# Patient Record
Sex: MALE | Race: WHITE | NOT HISPANIC OR LATINO | URBAN - METROPOLITAN AREA
[De-identification: names, ages, dates, MRNs, and addresses within clinical notes are randomized per-mention and may not be internally consistent; named-entity substitution may affect disease eponyms.]

---

## 2018-02-01 ENCOUNTER — OUTPATIENT (OUTPATIENT)
Dept: OUTPATIENT SERVICES | Facility: HOSPITAL | Age: 83
LOS: 1 days | End: 2018-02-01
Payer: MEDICAID

## 2018-02-01 PROCEDURE — G9001: CPT

## 2018-02-12 ENCOUNTER — EMERGENCY (EMERGENCY)
Facility: HOSPITAL | Age: 83
LOS: 1 days | Discharge: HOME | End: 2018-02-12
Attending: EMERGENCY MEDICINE | Admitting: EMERGENCY MEDICINE
Payer: MEDICARE

## 2018-02-12 VITALS
RESPIRATION RATE: 20 BRPM | DIASTOLIC BLOOD PRESSURE: 68 MMHG | HEART RATE: 79 BPM | TEMPERATURE: 100 F | OXYGEN SATURATION: 98 % | SYSTOLIC BLOOD PRESSURE: 156 MMHG

## 2018-02-12 VITALS
OXYGEN SATURATION: 96 % | HEART RATE: 98 BPM | SYSTOLIC BLOOD PRESSURE: 163 MMHG | RESPIRATION RATE: 18 BRPM | DIASTOLIC BLOOD PRESSURE: 66 MMHG | TEMPERATURE: 98 F

## 2018-02-12 LAB
ALBUMIN SERPL ELPH-MCNC: 3.9 G/DL — SIGNIFICANT CHANGE UP (ref 3.3–5)
ALP SERPL-CCNC: 92 U/L — SIGNIFICANT CHANGE UP (ref 40–120)
ALT FLD-CCNC: 17 U/L — SIGNIFICANT CHANGE UP (ref 4–41)
APTT BLD: 33.1 SEC — SIGNIFICANT CHANGE UP (ref 27.5–37.4)
AST SERPL-CCNC: 20 U/L — SIGNIFICANT CHANGE UP (ref 4–40)
B PERT DNA SPEC QL NAA+PROBE: SIGNIFICANT CHANGE UP
BASE EXCESS BLDV CALC-SCNC: 1.6 MMOL/L — SIGNIFICANT CHANGE UP
BASOPHILS # BLD AUTO: 0.02 K/UL — SIGNIFICANT CHANGE UP (ref 0–0.2)
BASOPHILS NFR BLD AUTO: 0.2 % — SIGNIFICANT CHANGE UP (ref 0–2)
BILIRUB SERPL-MCNC: 0.9 MG/DL — SIGNIFICANT CHANGE UP (ref 0.2–1.2)
BLOOD GAS VENOUS - CREATININE: 1.25 MG/DL — SIGNIFICANT CHANGE UP (ref 0.5–1.3)
BUN SERPL-MCNC: 18 MG/DL — SIGNIFICANT CHANGE UP (ref 7–23)
C PNEUM DNA SPEC QL NAA+PROBE: NOT DETECTED — SIGNIFICANT CHANGE UP
CALCIUM SERPL-MCNC: 8.8 MG/DL — SIGNIFICANT CHANGE UP (ref 8.4–10.5)
CHLORIDE BLDV-SCNC: 106 MMOL/L — SIGNIFICANT CHANGE UP (ref 96–108)
CHLORIDE SERPL-SCNC: 102 MMOL/L — SIGNIFICANT CHANGE UP (ref 98–107)
CK MB BLD-MCNC: 1.4 NG/ML — SIGNIFICANT CHANGE UP (ref 1–6.6)
CK SERPL-CCNC: 99 U/L — SIGNIFICANT CHANGE UP (ref 30–200)
CO2 SERPL-SCNC: 19 MMOL/L — LOW (ref 22–31)
CREAT SERPL-MCNC: 1.35 MG/DL — HIGH (ref 0.5–1.3)
EOSINOPHIL # BLD AUTO: 0 K/UL — SIGNIFICANT CHANGE UP (ref 0–0.5)
EOSINOPHIL NFR BLD AUTO: 0 % — SIGNIFICANT CHANGE UP (ref 0–6)
FLUAV H1 2009 PAND RNA SPEC QL NAA+PROBE: POSITIVE — HIGH
FLUAV H1 RNA SPEC QL NAA+PROBE: NOT DETECTED — SIGNIFICANT CHANGE UP
FLUAV H3 RNA SPEC QL NAA+PROBE: NOT DETECTED — SIGNIFICANT CHANGE UP
FLUBV RNA SPEC QL NAA+PROBE: NOT DETECTED — SIGNIFICANT CHANGE UP
GAS PNL BLDV: 135 MMOL/L — LOW (ref 136–146)
GLUCOSE BLDV-MCNC: 180 — HIGH (ref 70–99)
GLUCOSE SERPL-MCNC: 173 MG/DL — HIGH (ref 70–99)
HADV DNA SPEC QL NAA+PROBE: NOT DETECTED — SIGNIFICANT CHANGE UP
HCO3 BLDV-SCNC: 24 MMOL/L — SIGNIFICANT CHANGE UP (ref 20–27)
HCOV 229E RNA SPEC QL NAA+PROBE: NOT DETECTED — SIGNIFICANT CHANGE UP
HCOV HKU1 RNA SPEC QL NAA+PROBE: NOT DETECTED — SIGNIFICANT CHANGE UP
HCOV NL63 RNA SPEC QL NAA+PROBE: NOT DETECTED — SIGNIFICANT CHANGE UP
HCOV OC43 RNA SPEC QL NAA+PROBE: NOT DETECTED — SIGNIFICANT CHANGE UP
HCT VFR BLD CALC: 40.9 % — SIGNIFICANT CHANGE UP (ref 39–50)
HCT VFR BLDV CALC: 43.8 % — SIGNIFICANT CHANGE UP (ref 39–51)
HGB BLD-MCNC: 13.8 G/DL — SIGNIFICANT CHANGE UP (ref 13–17)
HGB BLDV-MCNC: 14.3 G/DL — SIGNIFICANT CHANGE UP (ref 13–17)
HMPV RNA SPEC QL NAA+PROBE: NOT DETECTED — SIGNIFICANT CHANGE UP
HPIV1 RNA SPEC QL NAA+PROBE: NOT DETECTED — SIGNIFICANT CHANGE UP
HPIV2 RNA SPEC QL NAA+PROBE: NOT DETECTED — SIGNIFICANT CHANGE UP
HPIV3 RNA SPEC QL NAA+PROBE: NOT DETECTED — SIGNIFICANT CHANGE UP
HPIV4 RNA SPEC QL NAA+PROBE: NOT DETECTED — SIGNIFICANT CHANGE UP
IMM GRANULOCYTES # BLD AUTO: 0.03 # — SIGNIFICANT CHANGE UP
IMM GRANULOCYTES NFR BLD AUTO: 0.3 % — SIGNIFICANT CHANGE UP (ref 0–1.5)
INR BLD: 1.08 — SIGNIFICANT CHANGE UP (ref 0.88–1.17)
LACTATE BLDV-MCNC: 1.7 MMOL/L — SIGNIFICANT CHANGE UP (ref 0.5–2)
LYMPHOCYTES # BLD AUTO: 1.11 K/UL — SIGNIFICANT CHANGE UP (ref 1–3.3)
LYMPHOCYTES # BLD AUTO: 11.9 % — LOW (ref 13–44)
M PNEUMO DNA SPEC QL NAA+PROBE: NOT DETECTED — SIGNIFICANT CHANGE UP
MAGNESIUM SERPL-MCNC: 1.7 MG/DL — SIGNIFICANT CHANGE UP (ref 1.6–2.6)
MCHC RBC-ENTMCNC: 29.5 PG — SIGNIFICANT CHANGE UP (ref 27–34)
MCHC RBC-ENTMCNC: 33.7 % — SIGNIFICANT CHANGE UP (ref 32–36)
MCV RBC AUTO: 87.4 FL — SIGNIFICANT CHANGE UP (ref 80–100)
MONOCYTES # BLD AUTO: 0.69 K/UL — SIGNIFICANT CHANGE UP (ref 0–0.9)
MONOCYTES NFR BLD AUTO: 7.4 % — SIGNIFICANT CHANGE UP (ref 2–14)
NEUTROPHILS # BLD AUTO: 7.5 K/UL — HIGH (ref 1.8–7.4)
NEUTROPHILS NFR BLD AUTO: 80.2 % — HIGH (ref 43–77)
NRBC # FLD: 0 — SIGNIFICANT CHANGE UP
PCO2 BLDV: 42 MMHG — SIGNIFICANT CHANGE UP (ref 41–51)
PH BLDV: 7.4 PH — SIGNIFICANT CHANGE UP (ref 7.32–7.43)
PHOSPHATE SERPL-MCNC: 2.9 MG/DL — SIGNIFICANT CHANGE UP (ref 2.5–4.5)
PLATELET # BLD AUTO: 142 K/UL — LOW (ref 150–400)
PMV BLD: 9.6 FL — SIGNIFICANT CHANGE UP (ref 7–13)
PO2 BLDV: < 24 MMHG — LOW (ref 35–40)
POTASSIUM BLDV-SCNC: 3.9 MMOL/L — SIGNIFICANT CHANGE UP (ref 3.4–4.5)
POTASSIUM SERPL-MCNC: 4.4 MMOL/L — SIGNIFICANT CHANGE UP (ref 3.5–5.3)
POTASSIUM SERPL-SCNC: 4.4 MMOL/L — SIGNIFICANT CHANGE UP (ref 3.5–5.3)
PROT SERPL-MCNC: 6.8 G/DL — SIGNIFICANT CHANGE UP (ref 6–8.3)
PROTHROM AB SERPL-ACNC: 12.4 SEC — SIGNIFICANT CHANGE UP (ref 9.8–13.1)
RBC # BLD: 4.68 M/UL — SIGNIFICANT CHANGE UP (ref 4.2–5.8)
RBC # FLD: 13.6 % — SIGNIFICANT CHANGE UP (ref 10.3–14.5)
RSV RNA SPEC QL NAA+PROBE: NOT DETECTED — SIGNIFICANT CHANGE UP
RV+EV RNA SPEC QL NAA+PROBE: NOT DETECTED — SIGNIFICANT CHANGE UP
SAO2 % BLDV: 33.6 % — LOW (ref 60–85)
SODIUM SERPL-SCNC: 140 MMOL/L — SIGNIFICANT CHANGE UP (ref 135–145)
TROPONIN T SERPL-MCNC: < 0.06 NG/ML — SIGNIFICANT CHANGE UP (ref 0–0.06)
WBC # BLD: 9.35 K/UL — SIGNIFICANT CHANGE UP (ref 3.8–10.5)
WBC # FLD AUTO: 9.35 K/UL — SIGNIFICANT CHANGE UP (ref 3.8–10.5)

## 2018-02-12 PROCEDURE — 70450 CT HEAD/BRAIN W/O DYE: CPT | Mod: 26

## 2018-02-12 PROCEDURE — 93010 ELECTROCARDIOGRAM REPORT: CPT

## 2018-02-12 PROCEDURE — 99284 EMERGENCY DEPT VISIT MOD MDM: CPT | Mod: 25,GC

## 2018-02-12 PROCEDURE — 71046 X-RAY EXAM CHEST 2 VIEWS: CPT | Mod: 26

## 2018-02-12 PROCEDURE — 72125 CT NECK SPINE W/O DYE: CPT | Mod: 26

## 2018-02-12 RX ORDER — ACETAMINOPHEN 500 MG
650 TABLET ORAL ONCE
Qty: 0 | Refills: 0 | Status: COMPLETED | OUTPATIENT
Start: 2018-02-12 | End: 2018-02-12

## 2018-02-12 RX ORDER — SODIUM CHLORIDE 9 MG/ML
1500 INJECTION INTRAMUSCULAR; INTRAVENOUS; SUBCUTANEOUS ONCE
Qty: 0 | Refills: 0 | Status: COMPLETED | OUTPATIENT
Start: 2018-02-12 | End: 2018-02-12

## 2018-02-12 RX ORDER — IBUPROFEN 200 MG
400 TABLET ORAL ONCE
Qty: 0 | Refills: 0 | Status: COMPLETED | OUTPATIENT
Start: 2018-02-12 | End: 2018-02-12

## 2018-02-12 RX ADMIN — Medication 650 MILLIGRAM(S): at 10:32

## 2018-02-12 RX ADMIN — SODIUM CHLORIDE 3000 MILLILITER(S): 9 INJECTION INTRAMUSCULAR; INTRAVENOUS; SUBCUTANEOUS at 10:28

## 2018-02-12 RX ADMIN — Medication 400 MILLIGRAM(S): at 12:43

## 2018-02-12 NOTE — ED PROVIDER NOTE - PLAN OF CARE
Follow up with your PMD in 24-48 hours. Return to the ED if you have any shortness of breath, chest pain, inability to drink, or any other concerning symptoms. Take tylenol as needed for fever.

## 2018-02-12 NOTE — ED ADULT NURSE NOTE - OBJECTIVE STATEMENT
pt received spot 24. pt A+Ox2  coming from home s/p fall last night at approx 2 am. pt was found on the floor by HHA this morning. pt states he does not recall the fall. questionable head injury/loc. pt denies any pain or discomfort. + wet cough noted. +chills. pt febrile. respirations even and unlabored. placed on CM. EKG in progress. labs sent. IVSL in place. pt placed on droplet precautions to r/i influenza. family at bedside. skin warm, dry and intact. will continue to monitor.

## 2018-02-12 NOTE — ED PROVIDER NOTE - MEDICAL DECISION MAKING DETAILS
86M PMH HTN T2DM CAD on A/C presenting s/p fall, found to be febrile with cough, and family worried about AMS. Will check CT-H, chest xray, sepsis work up, possible admit, give IVF

## 2018-02-12 NOTE — ED PROVIDER NOTE - ATTENDING CONTRIBUTION TO CARE
87yo M found on floor, down overnight, fall out of bed, unable to give hx of fall, found to be febrile, +cough/congestion, denies prev. recent illness, no sick contacts, takes coumadin, denies HA/N/V/vision change. Clear lungs, heart irreg, no murmur, soft, NT abd, NT calves, moves all ext., no skin ecchymoses.

## 2018-02-12 NOTE — ED ADULT TRIAGE NOTE - CHIEF COMPLAINT QUOTE
Pt brought in by EMS from home, pt Solomon Islander speaking with daughter in law at bedside to translate. Pt was found on the floor by his home health aide this AM. Pt denies any pain but states he feels SOB. Pt denies chest pain, N/V/D, fever chills, no LOC. .

## 2018-02-12 NOTE — ED PROVIDER NOTE - PROGRESS NOTE DETAILS
Mateo: Pt flu positive but feels improved afte rtylenol, IVF. No evidence of PNA on CXR. CMP at baseline. Will d/c home

## 2018-02-12 NOTE — ED ADULT NURSE NOTE - CHIEF COMPLAINT QUOTE
Pt brought in by EMS from home, pt Turkmen speaking with daughter in law at bedside to translate. Pt was found on the floor by his home health aide this AM. Pt denies any pain but states he feels SOB. Pt denies chest pain, N/V/D, fever chills, no LOC. .

## 2018-02-12 NOTE — ED PROVIDER NOTE - OBJECTIVE STATEMENT
86M PMH T2DM, HTN, CAD s/p stent 5 y ago, on coumadin for unclear reason, who presents s/p fall at home today.  Per family at bedside the pt was found on the floor of his bedroom at 7:30 am today, neighbors noted that they heard a thud at approx 2AM. Pt had been in normal health prior to today, ambulating around the neighborhood and performing most of his ADLs. Currently has cough with sputum production for unknown amount of time. No known fevers, chills, abd pain, n/v/d. No known sick contacts. No recent travel. Pt had knee injection approx 1 week ago for difficulty moving.     Pt is Tuvaluan speaking and requesting family at bedside to translate, translation services offered.    Meds: coumadin, metformin, tamsulosin, glipizide, nifedipine, vascepa, isosrbide, diovan, coumadin

## 2018-02-12 NOTE — ED PROVIDER NOTE - CARE PLAN
Principal Discharge DX:	Fever Principal Discharge DX:	Fever  Assessment and plan of treatment:	Follow up with your PMD in 24-48 hours. Return to the ED if you have any shortness of breath, chest pain, inability to drink, or any other concerning symptoms. Take tylenol as needed for fever.

## 2018-02-13 LAB
SPECIMEN SOURCE: SIGNIFICANT CHANGE UP
SPECIMEN SOURCE: SIGNIFICANT CHANGE UP

## 2018-02-16 ENCOUNTER — INPATIENT (INPATIENT)
Facility: HOSPITAL | Age: 83
LOS: 3 days | Discharge: HOME HEALTH | End: 2018-02-20
Attending: HOSPITALIST | Admitting: HOSPITALIST
Payer: MEDICARE

## 2018-02-16 VITALS
DIASTOLIC BLOOD PRESSURE: 85 MMHG | TEMPERATURE: 99 F | SYSTOLIC BLOOD PRESSURE: 103 MMHG | RESPIRATION RATE: 16 BRPM | OXYGEN SATURATION: 100 % | HEART RATE: 78 BPM

## 2018-02-16 DIAGNOSIS — E78.00 PURE HYPERCHOLESTEROLEMIA, UNSPECIFIED: ICD-10-CM

## 2018-02-16 DIAGNOSIS — R74.8 ABNORMAL LEVELS OF OTHER SERUM ENZYMES: ICD-10-CM

## 2018-02-16 DIAGNOSIS — I48.91 UNSPECIFIED ATRIAL FIBRILLATION: ICD-10-CM

## 2018-02-16 DIAGNOSIS — E11.9 TYPE 2 DIABETES MELLITUS WITHOUT COMPLICATIONS: ICD-10-CM

## 2018-02-16 DIAGNOSIS — Z95.5 PRESENCE OF CORONARY ANGIOPLASTY IMPLANT AND GRAFT: Chronic | ICD-10-CM

## 2018-02-16 DIAGNOSIS — I50.9 HEART FAILURE, UNSPECIFIED: ICD-10-CM

## 2018-02-16 DIAGNOSIS — J18.9 PNEUMONIA, UNSPECIFIED ORGANISM: ICD-10-CM

## 2018-02-16 DIAGNOSIS — I10 ESSENTIAL (PRIMARY) HYPERTENSION: ICD-10-CM

## 2018-02-16 DIAGNOSIS — R07.81 PLEURODYNIA: ICD-10-CM

## 2018-02-16 LAB
ALBUMIN SERPL ELPH-MCNC: 3.7 G/DL — SIGNIFICANT CHANGE UP (ref 3.3–5)
ALP SERPL-CCNC: 204 U/L — HIGH (ref 40–120)
ALT FLD-CCNC: 41 U/L — SIGNIFICANT CHANGE UP (ref 4–41)
APTT BLD: 33.6 SEC — SIGNIFICANT CHANGE UP (ref 27.5–37.4)
AST SERPL-CCNC: 34 U/L — SIGNIFICANT CHANGE UP (ref 4–40)
BASE EXCESS BLDV CALC-SCNC: 1.2 MMOL/L — SIGNIFICANT CHANGE UP
BASOPHILS # BLD AUTO: 0.04 K/UL — SIGNIFICANT CHANGE UP (ref 0–0.2)
BASOPHILS NFR BLD AUTO: 0.4 % — SIGNIFICANT CHANGE UP (ref 0–2)
BILIRUB SERPL-MCNC: 0.9 MG/DL — SIGNIFICANT CHANGE UP (ref 0.2–1.2)
BLOOD GAS VENOUS - CREATININE: 1 MG/DL — SIGNIFICANT CHANGE UP (ref 0.5–1.3)
BUN SERPL-MCNC: 16 MG/DL — SIGNIFICANT CHANGE UP (ref 7–23)
CALCIUM SERPL-MCNC: 8.3 MG/DL — LOW (ref 8.4–10.5)
CHLORIDE BLDV-SCNC: 109 MMOL/L — HIGH (ref 96–108)
CHLORIDE SERPL-SCNC: 105 MMOL/L — SIGNIFICANT CHANGE UP (ref 98–107)
CK MB BLD-MCNC: 2.09 NG/ML — SIGNIFICANT CHANGE UP (ref 1–6.6)
CK SERPL-CCNC: 116 U/L — SIGNIFICANT CHANGE UP (ref 30–200)
CO2 SERPL-SCNC: 25 MMOL/L — SIGNIFICANT CHANGE UP (ref 22–31)
CREAT SERPL-MCNC: 1.07 MG/DL — SIGNIFICANT CHANGE UP (ref 0.5–1.3)
EOSINOPHIL # BLD AUTO: 0.03 K/UL — SIGNIFICANT CHANGE UP (ref 0–0.5)
EOSINOPHIL NFR BLD AUTO: 0.3 % — SIGNIFICANT CHANGE UP (ref 0–6)
GAS PNL BLDV: 136 MMOL/L — SIGNIFICANT CHANGE UP (ref 136–146)
GLUCOSE BLDV-MCNC: 213 — HIGH (ref 70–99)
GLUCOSE SERPL-MCNC: 215 MG/DL — HIGH (ref 70–99)
HCO3 BLDV-SCNC: 25 MMOL/L — SIGNIFICANT CHANGE UP (ref 20–27)
HCT VFR BLD CALC: 37.4 % — LOW (ref 39–50)
HCT VFR BLDV CALC: 40.4 % — SIGNIFICANT CHANGE UP (ref 39–51)
HGB BLD-MCNC: 12.3 G/DL — LOW (ref 13–17)
HGB BLDV-MCNC: 13.1 G/DL — SIGNIFICANT CHANGE UP (ref 13–17)
IMM GRANULOCYTES # BLD AUTO: 0.06 # — SIGNIFICANT CHANGE UP
IMM GRANULOCYTES NFR BLD AUTO: 0.6 % — SIGNIFICANT CHANGE UP (ref 0–1.5)
INR BLD: 1.04 — SIGNIFICANT CHANGE UP (ref 0.88–1.17)
LACTATE BLDV-MCNC: 1.4 MMOL/L — SIGNIFICANT CHANGE UP (ref 0.5–2)
LYMPHOCYTES # BLD AUTO: 0.96 K/UL — LOW (ref 1–3.3)
LYMPHOCYTES # BLD AUTO: 10 % — LOW (ref 13–44)
MCHC RBC-ENTMCNC: 28.6 PG — SIGNIFICANT CHANGE UP (ref 27–34)
MCHC RBC-ENTMCNC: 32.9 % — SIGNIFICANT CHANGE UP (ref 32–36)
MCV RBC AUTO: 87 FL — SIGNIFICANT CHANGE UP (ref 80–100)
MONOCYTES # BLD AUTO: 0.56 K/UL — SIGNIFICANT CHANGE UP (ref 0–0.9)
MONOCYTES NFR BLD AUTO: 5.8 % — SIGNIFICANT CHANGE UP (ref 2–14)
NEUTROPHILS # BLD AUTO: 7.98 K/UL — HIGH (ref 1.8–7.4)
NEUTROPHILS NFR BLD AUTO: 82.9 % — HIGH (ref 43–77)
NRBC # FLD: 0 — SIGNIFICANT CHANGE UP
NT-PROBNP SERPL-SCNC: 1945 PG/ML — SIGNIFICANT CHANGE UP
PCO2 BLDV: 36 MMHG — LOW (ref 41–51)
PH BLDV: 7.45 PH — HIGH (ref 7.32–7.43)
PLATELET # BLD AUTO: 173 K/UL — SIGNIFICANT CHANGE UP (ref 150–400)
PMV BLD: 9.8 FL — SIGNIFICANT CHANGE UP (ref 7–13)
PO2 BLDV: 28 MMHG — LOW (ref 35–40)
POTASSIUM BLDV-SCNC: 3.8 MMOL/L — SIGNIFICANT CHANGE UP (ref 3.4–4.5)
POTASSIUM SERPL-MCNC: 4.2 MMOL/L — SIGNIFICANT CHANGE UP (ref 3.5–5.3)
POTASSIUM SERPL-SCNC: 4.2 MMOL/L — SIGNIFICANT CHANGE UP (ref 3.5–5.3)
PROT SERPL-MCNC: 6.6 G/DL — SIGNIFICANT CHANGE UP (ref 6–8.3)
PROTHROM AB SERPL-ACNC: 11.5 SEC — SIGNIFICANT CHANGE UP (ref 9.8–13.1)
RBC # BLD: 4.3 M/UL — SIGNIFICANT CHANGE UP (ref 4.2–5.8)
RBC # FLD: 13.8 % — SIGNIFICANT CHANGE UP (ref 10.3–14.5)
SAO2 % BLDV: 49.9 % — LOW (ref 60–85)
SODIUM SERPL-SCNC: 142 MMOL/L — SIGNIFICANT CHANGE UP (ref 135–145)
TROPONIN T SERPL-MCNC: < 0.06 NG/ML — SIGNIFICANT CHANGE UP (ref 0–0.06)
WBC # BLD: 9.63 K/UL — SIGNIFICANT CHANGE UP (ref 3.8–10.5)
WBC # FLD AUTO: 9.63 K/UL — SIGNIFICANT CHANGE UP (ref 3.8–10.5)

## 2018-02-16 PROCEDURE — 71275 CT ANGIOGRAPHY CHEST: CPT | Mod: 26

## 2018-02-16 PROCEDURE — 71045 X-RAY EXAM CHEST 1 VIEW: CPT | Mod: 26

## 2018-02-16 PROCEDURE — 99223 1ST HOSP IP/OBS HIGH 75: CPT

## 2018-02-16 RX ORDER — NIFEDIPINE 30 MG
30 TABLET, EXTENDED RELEASE 24 HR ORAL DAILY
Qty: 0 | Refills: 0 | Status: DISCONTINUED | OUTPATIENT
Start: 2018-02-16 | End: 2018-02-20

## 2018-02-16 RX ORDER — SODIUM CHLORIDE 9 MG/ML
1000 INJECTION, SOLUTION INTRAVENOUS
Qty: 0 | Refills: 0 | Status: DISCONTINUED | OUTPATIENT
Start: 2018-02-16 | End: 2018-02-20

## 2018-02-16 RX ORDER — DEXTROSE 50 % IN WATER 50 %
25 SYRINGE (ML) INTRAVENOUS ONCE
Qty: 0 | Refills: 0 | Status: DISCONTINUED | OUTPATIENT
Start: 2018-02-16 | End: 2018-02-20

## 2018-02-16 RX ORDER — DEXTROSE 50 % IN WATER 50 %
1 SYRINGE (ML) INTRAVENOUS ONCE
Qty: 0 | Refills: 0 | Status: DISCONTINUED | OUTPATIENT
Start: 2018-02-16 | End: 2018-02-20

## 2018-02-16 RX ORDER — TAMSULOSIN HYDROCHLORIDE 0.4 MG/1
0.4 CAPSULE ORAL AT BEDTIME
Qty: 0 | Refills: 0 | Status: DISCONTINUED | OUTPATIENT
Start: 2018-02-16 | End: 2018-02-20

## 2018-02-16 RX ORDER — DEXTROSE 50 % IN WATER 50 %
12.5 SYRINGE (ML) INTRAVENOUS ONCE
Qty: 0 | Refills: 0 | Status: DISCONTINUED | OUTPATIENT
Start: 2018-02-16 | End: 2018-02-20

## 2018-02-16 RX ORDER — GLUCAGON INJECTION, SOLUTION 0.5 MG/.1ML
1 INJECTION, SOLUTION SUBCUTANEOUS ONCE
Qty: 0 | Refills: 0 | Status: DISCONTINUED | OUTPATIENT
Start: 2018-02-16 | End: 2018-02-20

## 2018-02-16 RX ORDER — ACETAMINOPHEN 500 MG
650 TABLET ORAL EVERY 6 HOURS
Qty: 0 | Refills: 0 | Status: DISCONTINUED | OUTPATIENT
Start: 2018-02-16 | End: 2018-02-20

## 2018-02-16 RX ORDER — INSULIN LISPRO 100/ML
VIAL (ML) SUBCUTANEOUS AT BEDTIME
Qty: 0 | Refills: 0 | Status: DISCONTINUED | OUTPATIENT
Start: 2018-02-16 | End: 2018-02-20

## 2018-02-16 RX ORDER — ISOSORBIDE MONONITRATE 60 MG/1
30 TABLET, EXTENDED RELEASE ORAL DAILY
Qty: 0 | Refills: 0 | Status: DISCONTINUED | OUTPATIENT
Start: 2018-02-16 | End: 2018-02-20

## 2018-02-16 RX ORDER — ENOXAPARIN SODIUM 100 MG/ML
40 INJECTION SUBCUTANEOUS EVERY 24 HOURS
Qty: 0 | Refills: 0 | Status: DISCONTINUED | OUTPATIENT
Start: 2018-02-16 | End: 2018-02-19

## 2018-02-16 RX ORDER — FUROSEMIDE 40 MG
40 TABLET ORAL
Qty: 0 | Refills: 0 | Status: DISCONTINUED | OUTPATIENT
Start: 2018-02-16 | End: 2018-02-17

## 2018-02-16 RX ORDER — FUROSEMIDE 40 MG
40 TABLET ORAL ONCE
Qty: 0 | Refills: 0 | Status: COMPLETED | OUTPATIENT
Start: 2018-02-16 | End: 2018-02-16

## 2018-02-16 RX ORDER — CEFEPIME 1 G/1
1000 INJECTION, POWDER, FOR SOLUTION INTRAMUSCULAR; INTRAVENOUS ONCE
Qty: 0 | Refills: 0 | Status: COMPLETED | OUTPATIENT
Start: 2018-02-16 | End: 2018-02-16

## 2018-02-16 RX ORDER — VALSARTAN 80 MG/1
80 TABLET ORAL DAILY
Qty: 0 | Refills: 0 | Status: DISCONTINUED | OUTPATIENT
Start: 2018-02-16 | End: 2018-02-20

## 2018-02-16 RX ORDER — ASPIRIN/CALCIUM CARB/MAGNESIUM 324 MG
162 TABLET ORAL ONCE
Qty: 0 | Refills: 0 | Status: COMPLETED | OUTPATIENT
Start: 2018-02-16 | End: 2018-02-16

## 2018-02-16 RX ORDER — VANCOMYCIN HCL 1 G
1000 VIAL (EA) INTRAVENOUS ONCE
Qty: 0 | Refills: 0 | Status: COMPLETED | OUTPATIENT
Start: 2018-02-16 | End: 2018-02-16

## 2018-02-16 RX ORDER — ASPIRIN/CALCIUM CARB/MAGNESIUM 324 MG
81 TABLET ORAL DAILY
Qty: 0 | Refills: 0 | Status: DISCONTINUED | OUTPATIENT
Start: 2018-02-17 | End: 2018-02-20

## 2018-02-16 RX ORDER — INSULIN LISPRO 100/ML
VIAL (ML) SUBCUTANEOUS
Qty: 0 | Refills: 0 | Status: DISCONTINUED | OUTPATIENT
Start: 2018-02-16 | End: 2018-02-20

## 2018-02-16 RX ADMIN — TAMSULOSIN HYDROCHLORIDE 0.4 MILLIGRAM(S): 0.4 CAPSULE ORAL at 23:23

## 2018-02-16 RX ADMIN — CEFEPIME 100 MILLIGRAM(S): 1 INJECTION, POWDER, FOR SOLUTION INTRAMUSCULAR; INTRAVENOUS at 12:50

## 2018-02-16 RX ADMIN — Medication 30 MILLIGRAM(S): at 16:52

## 2018-02-16 RX ADMIN — Medication 250 MILLIGRAM(S): at 14:11

## 2018-02-16 RX ADMIN — Medication 40 MILLIGRAM(S): at 23:24

## 2018-02-16 RX ADMIN — Medication 162 MILLIGRAM(S): at 09:18

## 2018-02-16 RX ADMIN — Medication 40 MILLIGRAM(S): at 14:11

## 2018-02-16 RX ADMIN — Medication 4: at 17:25

## 2018-02-16 RX ADMIN — ISOSORBIDE MONONITRATE 30 MILLIGRAM(S): 60 TABLET, EXTENDED RELEASE ORAL at 16:52

## 2018-02-16 NOTE — ED PROVIDER NOTE - OBJECTIVE STATEMENT
85 y/o male with PMH T2DM, HTN, CAD s/p stent 5 years ago, on coumadin for unclear reason, recent dx w/ Flu on Azithromycin p/w SOB. BIBA hypoxic and  tayhypenic, 88% on room air. Patient is Mauritanian Speaking with family at the bedside translating. 87 y/o male with PMH T2DM, HTN, CAD s/p stent 5 years ago, on coumadin for unclear reason, recent dx w/ Flu on Monday, on Azithromycin p/w SOB. BIBA hypoxic and  tachypenic, 88% on room air. Patient is Micronesian Speaking with family at the bedside translating. Seen in ED few days ago with a fall - per documentation had a cough at this time. Was ultimately d/c home on ibuprofen. Per patient, since 5 am been c/o SOB and some substernal chest pain. Unable to accurately describe the pain. Statse still has some pain now but better. Reports some vomiting yesterday, no diarrhea. States his cough is getting worse, feels the mucous in his chest but unable to clear it. No hx of heart failure. Also reporting some abdominal pain. 87 y/o male with PMH T2DM, HTN, CAD s/p stent 5 years ago, on coumadin for a fib, recent dx w/ Flu on Monday, on Azithromycin p/w SOB. BIBA hypoxic and  tachypneic, 88% on room air. Patient is Samoan Speaking with family at the bedside translating. Seen in ED few days ago with a fall - per documentation had a cough at this time. Was ultimately d/c home on ibuprofen. Per patient, since 5 am been c/o SOB and some substernal chest pain. Unable to accurately describe the pain. States still has some pain now but better. Reports some vomiting yesterday, no diarrhea. States his cough is getting worse, feels the mucous in his chest but unable to clear it. No hx of heart failure. States has to sleep sitting up b/c SOB and cough worse when lying flat. No inc weight gain or inc urination. 87 y/o male with PMH T2DM, HTN, CAD s/p stent 5 years ago, on coumadin for a fib, recent dx w/ Flu on Monday (confirmed), on Azithromycin p/w SOB. BIBA hypoxic and  tachypneic, 88% on room air. Patient is Omani Speaking with family at the bedside translating. Seen in ED few days ago with a fall - per documentation had a cough at this time. Was ultimately d/c home on ibuprofen. Per patient, since 5 am been c/o SOB and some substernal chest pain. Unable to accurately describe the pain. States still has some pain now but better. Reports some vomiting yesterday, no diarrhea. States his cough is getting worse, feels the mucous in his chest but unable to clear it. No hx of heart failure. States has to sleep sitting up b/c SOB and cough worse when lying flat. No inc weight gain or inc urination.

## 2018-02-16 NOTE — H&P ADULT - PROBLEM SELECTOR PLAN 6
Monitor BP. Meds as needed. Monitor BP. started on low dosage on home meds until they can be verified Ordered FLP.

## 2018-02-16 NOTE — H&P ADULT - PROBLEM SELECTOR PLAN 2
F/u TTE to r/o HF, pericarditis.  Continue nasal O2. HR at goal; not on rate controlling agent. Reportedly using Coumadin but INR is subtherapeutic. [id number] 4-5. No indication for bridging as day to day risk of stroke with chronic atrial fibrillation is low.    Will obtain TTE. If no evidence of significant valvular dz, will start on DOAC. Otherwise, will need to begin warfarin dosing tomorrow.

## 2018-02-16 NOTE — H&P ADULT - RS GEN PE MLT RESP DETAILS PC
respirations non-labored/airway patent/diminished breath sounds, L/no chest wall tenderness/no intercostal retractions/diminished breath sounds B/L/diminished breath sounds, R respirations non-labored/diminished breath sounds, R/no chest wall tenderness/no intercostal retractions/airway patent/diminished breath sounds, L/rales/diminished breath sounds B/L

## 2018-02-16 NOTE — H&P ADULT - ASSESSMENT
85 y/o male with a PmHx of T2DM, HTN, CAD (stent placement in 2013), Afib (on coumadin), and recent dx of influenza A (d/c from [facility] 2/12/18) presents to ED with  RVP (+) for Influenza AH3, elevated BNP (1945) and 1 day hx of CP and SOB. CTPA reveals B/L pulmonary edema. Admit to telemetry to r/o ACS. DDx includes viral pericarditis, CHF. 85 y/o male with a PmHx of T2DM, HTN, CAD (stent placement in 2013), Afib (on coumadin), and recent dx of influenza A (d/c from [facility] 2/12/18) presents to ED with  RVP (+) for Influenza AH3, elevated BNP (1945) and 1 day hx of CP and SOB. CTPA reveals B/L pulmonary edema. Admit to telemetry to r/o ACS.

## 2018-02-16 NOTE — H&P ADULT - PROBLEM SELECTOR PLAN 5
POC glucose checks. Sliding scale insulin. Ordered A1C. POC glucose checks. Sliding scale insulin. Ordered A1C.  hold PO meds for now Monitor BP. started on low dosage on home meds until they can be verified; family to bring in medication bottles tomorrow (they do not live with the patient)

## 2018-02-16 NOTE — H&P ADULT - HISTORY OF PRESENT ILLNESS
85 y/o male with a PmHx of T2DM, HTN, CAD (stent placement in 2013), Afib (on coumadin), and recent dx of influenza A (d/c from [facility] 2/12/18) presents to ED with 1 day hx of CP and SOB. Pt reports that at 5am this morning, he developed substernal, non-radiating, pleuritic CP with associated SOB while lying in bed. He describes the CP as "stabbing" and is worsened with inspiration and while lying flat. The SOB is exclusively associated with CP. Of note, pt states that this is his first episode of CP (did not have CP prior to stent placement 5 yr ago). He also reports generalized weakness, new-onset B/L LE edema, and worsening, non-productive cough. He states that he feels like he has mucus in his lungs but is unable to clear it. Denies dizziness/syncope, palpitations, fever, chills, diaphoresis, abdominal pain, N/V/D, changes in bowel or bladder habits. Denies personal or fam hx of HF, stroke, or MI. Last cardiologist visit approx 1 yr ago, last stress/echo >3yr ago.     Upon examination in ED, pt is sitting comfortably, denies CP/SOB while in upright position. Admits to persistent, non-productive cough. Admitted to telemetry to r/o ACS.     Pt and fam unsure of medications. Pharmacy and PCP contacted via telephone, both close early on Friday and do not reopen until Monday. 85 y/o male with a PmHx of T2DM, HTN, CAD (stent placement in 2013), Afib (on coumadin), and recent dx of influenza A (d/c from [facility] 2/12/18) presents to ED with 1 day hx of CP and SOB. Pt reports that at 5am this morning, he developed substernal, non-radiating, pleuritic CP with associated SOB while lying in bed. He describes the CP as "stabbing" and is worsened with inspiration and while lying flat. The SOB is exclusively associated with CP. Of note, pt states that this is his first episode of CP (did not have CP prior to stent placement 5 yr ago). He also reports generalized weakness, new-onset B/L LE edema, and worsening, non-productive cough. He states that he feels like he has mucus in his lungs but is unable to clear it. Denies dizziness/syncope, palpitations, fever, chills, diaphoresis, abdominal pain, N/V/D, changes in bowel or bladder habits. Denies personal or fam hx of HF, stroke, or MI. Last cardiologist visit approx 1 yr ago, last stress/echo >3yr ago.     Upon examination in ED, pt is sitting comfortably, denies CP/SOB while in upright position. Admits to persistent, non-productive cough. Admitted to telemetry to r/o ACS.     Pt and fam unsure of medications. Pharmacy and PCP contacted via telephone, both close early on Friday and do not reopen until Monday. Per ED note 2/12/18, meds: coumadin, metformin, tamsulosin, glipizide, nifedipine, vascepa, isosrbide, diovan. 85 y/o male. with a PmHx of T2DM, HTN, CAD (stent placement in 2013), Afib (on coumadin), and recent dx of influenza AH3 (d/c from [facility] 2/12/18), presents to ED with 1 day hx of CP and SOB. Pt reports that at 5am this morning, he developed substernal, non-radiating, pleuritic CP with associated SOB while lying in bed. He describes the CP as "stabbing" and is worsened with inspiration and while lying flat. The SOB is exclusively associated with CP. Pt was unable to rate the pain on a 1-10 scale. Of note, pt states that this is his first episode of CP (did not have CP prior to stent placement 5 yr ago). He also reports generalized weakness, new-onset B/L LE edema, and worsening, non-productive cough. He states that he feels like he has mucus in his lungs but is unable to clear it. Denies dizziness/syncope, palpitations, fever, chills, diaphoresis, abdominal pain, N/V/D, changes in bowel or bladder habits. Denies personal or family hx of HF, stroke, or MI. Last cardiologist visit approx 1 yr ago, last stress/echo >3yr ago.     Upon examination in ED, pt is sitting comfortably, denies CP/SOB while in upright position. Admits to persistent, non-productive cough. Admitted to telemetry to r/o ACS.     Pt and fam unsure of medications. Pharmacy and PCP contacted via telephone, both close early on Friday and do not reopen until Monday. Per ED note 2/12/18, meds: coumadin, metformin, tamsulosin, glipizide, nifedipine, vascepa, isosrbide, diovan.

## 2018-02-16 NOTE — H&P ADULT - PROBLEM SELECTOR PLAN 4
Repeat alk phos level. POC glucose checks. Sliding scale insulin. Ordered A1C.  hold PO meds for now

## 2018-02-16 NOTE — H&P ADULT - NSHPSOCIALHISTORY_GEN_ALL_CORE
Pt lives alone, has home attendant that comes M,T,F,Sat from 8am-3:30pm. Ambulates with walker. Denies home O2/CPAP. Follows a Kosher diet.     Marital Status:     Occupation: retired    Tobacco Use: denies past or present hx    ETOH Use: denies past or present hx    Flu Vaccine: did not obtain 2017-18 season                                  Pneumonia Vaccine: did not obtain

## 2018-02-16 NOTE — H&P ADULT - MUSCULOSKELETAL
details… detailed exam no joint warmth/normal/no joint swelling/no calf tenderness/normal strength/no joint erythema/ROM intact

## 2018-02-16 NOTE — ED PROVIDER NOTE - ATTENDING CONTRIBUTION TO CARE
86M  p/w SOBx few days, worsening today.  was seen a few days ago after a fall and discharged, incidentally dx with Flu A after related had a cough.  Since then, persistent cough, no fever, (+)SOB and STORY.  Mild SSCP as well.  BIB EMS, found to have hypoxia to high 80's which is new for him.  Family related some abd swelling as well.  No leg swelling.  (+)Orthopnea.  On exam mild resp distress improved with oxygen, occasional cough.  Bilateral crackles, mild abd distension nontender.  Will check CXR and CTA chest - found to have assymetric pulmonary edema.  Given recent dx flu, potential for flu-monia vs superinfection also - rx abx.  Admit for further w/u and treat.  Currently outside window for tamiflu benefit.  VS:  unremarkable except hypoxia correcting with oxygen via NC    GEN - mild resp distress; A+O x3 Eating.   HEAD - NC/AT     ENT - PEERL, EOMI, mucous membranes  moist , no discharge      NECK: Neck supple, non-tender without lymphadenopathy, no masses, no JVD  PULM - bilat crackles,  symmetric breath sounds  COR -  normal heart sounds    ABD - , mild distension, NT, soft, no guarding, no rebound, no masses    BACK - no CVA tenderness, nontender spine     EXTREMS - no edema, no deformity, warm and well perfused    SKIN - no rash or bruising      NEUROLOGIC - alert, CN 2-12 intact, sensation nl, motor 5/5 RUE/LUE/RLE/LLE.

## 2018-02-16 NOTE — H&P ADULT - NSHPPHYSICALEXAM_GEN_ALL_CORE
ICU Vital Signs Last 24 Hrs  T(C): 37.3 (16 Feb 2018 08:05), Max: 37.3 (16 Feb 2018 08:05)  T(F): 99.1 (16 Feb 2018 08:05), Max: 99.1 (16 Feb 2018 08:05)  HR: 75 (16 Feb 2018 14:11) (70 - 80)  BP: 179/68 (16 Feb 2018 14:11) (103/85 - 187/138)  BP(mean): --  ABP: --  ABP(mean): --  RR: 16 (16 Feb 2018 14:11) (16 - 20)  SpO2: 95% (16 Feb 2018 14:11) (88% - 100%)    EKG: Afib w/ competing junctional pacemaker @ 76 bpm ICU Vital Signs Last 24 Hrs  T(C): 37.3 (16 Feb 2018 08:05), Max: 37.3 (16 Feb 2018 08:05)  T(F): 99.1 (16 Feb 2018 08:05), Max: 99.1 (16 Feb 2018 08:05)  HR: 75 (16 Feb 2018 14:11) (70 - 80)  BP: 179/68 (16 Feb 2018 14:11) (103/85 - 187/138)  BP(mean): --  ABP: --  ABP(mean): --  RR: 16 (16 Feb 2018 14:11) (16 - 20)  SpO2: 95% (16 Feb 2018 14:11) (88% - 100%)    EKG: Afib, rate 67

## 2018-02-16 NOTE — ED PROVIDER NOTE - CONSTITUTIONAL, MLM
normal... Well appearing, well nourished, awake, alert, oriented to person, place, time/situation and in no apparent distress. [place]araguan Speaking

## 2018-02-16 NOTE — H&P ADULT - NEGATIVE NEUROLOGICAL SYMPTOMS
no headache/no transient paralysis/no weakness/no focal seizures/no loss of sensation/no difficulty walking/no generalized seizures/no paresthesias/no syncope

## 2018-02-16 NOTE — ED PROVIDER NOTE - RESPIRATORY, MLM
Saturating 88% on room air, 93% on 2L NC. Diffuse crackles thoughout, L>>R w/ some expiratory wheezes that increase as you move superiorly. REquests to be sat vertically when lying patient flat.

## 2018-02-16 NOTE — H&P ADULT - PROBLEM SELECTOR PLAN 1
Admit to telemetry. Serial cardiac enzymes. Serial EKGs. Monitor vitals.   Order TTE d/t sx of pericarditis and elevated BNP.  Cardiac c/s to eval for CHF.   Consider IV lasix for B/L LE edema. Admit to telemetry. Serial cardiac enzymes. Serial EKGs. Monitor vitals.   Order TTE d/t sx of pericarditis and elevated BNP.  Cardiac c/s to eval for CHF.   Started on IV lasix 40mg bid Lower extremity swelling, inspiratory crackles, and elevated pro-BNP concerning for acute heart failure vs cardiomyopathy (no prior h/o heart failure per patient's report). Will initiate IV furosemide 40mg bid.     Patient does not know doses of medications so will start lowest doses of valsartan and nifedipine.     Precipitant of acute HF unclear. Cardiac enzymes wnl x1. No ST/T wave changes on ECG. Will monitor on telemetry and obtain 2nd set of CE. TTE ordered. c/w O2 NC to maintain O2 sat >90%.    c/w ASA given h/o CAD

## 2018-02-16 NOTE — ED PROVIDER NOTE - MEDICAL DECISION MAKING DETAILS
Alex resident: 87 y/o T2DM, HTN, CAD s/p stent 5 years ago, on coumadin for a fib recent, possibly on azithro for bronchitis p/w SOB. States worsening cough, inc orthopnea - sounds very virally on exam w/ some expiratry wheezes - afebrile, but ddx includes Acute CHF, ACS, pneumonia, flu - requiring oxygen, will need admission - does not look toxic - will check labs, EKG, Trops, BNP, oxygen, CXR, and admit

## 2018-02-16 NOTE — H&P ADULT - NEGATIVE ENMT SYMPTOMS
no throat pain/no dysphagia/no dry mouth/no ear pain/no tinnitus/no sinus symptoms/no hearing difficulty/no vertigo/no nasal obstruction

## 2018-02-16 NOTE — H&P ADULT - NEGATIVE OPHTHALMOLOGIC SYMPTOMS
no diplopia/no blurred vision L/no photophobia/no discharge R/no pain L/no lacrimation R/no discharge L/no pain R/no irritation L/no irritation R/no loss of vision L/no lacrimation L/no blurred vision R

## 2018-02-16 NOTE — H&P ADULT - NEGATIVE MUSCULOSKELETAL SYMPTOMS
no joint swelling/no myalgia/no stiffness/no muscle weakness/no arthralgia/no arthritis/no muscle cramps no arthritis/no muscle weakness/no back pain/no joint swelling/no stiffness/no myalgia/no muscle cramps/no neck pain/no arthralgia

## 2018-02-16 NOTE — H&P ADULT - PROBLEM SELECTOR PLAN 3
Continue coumadin. Monitor INR. Pt not therapeutic on coumadin, will start prophylaxis lovenox for now, consider heparin to coumadin bridge vs a noac In setting of congestive heart failure. No signs of liver failure.

## 2018-02-16 NOTE — ED ADULT NURSE NOTE - OBJECTIVE STATEMENT
pt arrived via ems from home w/ c/o increasing sob, chest pain. abd firm and distended on assessment. changed into gown, attached to monitors. desat 87-88% ra, initiated 2 then 3 lpm O2 via NC for sats greater then 92%. IV patent and intact from ems. labs drawn, fall and safety jamari maintained. denies allergies. daughter at bedside to translate. speaks Indian.

## 2018-02-16 NOTE — ED ADULT TRIAGE NOTE - CHIEF COMPLAINT QUOTE
Brought by [facility] for SOB sent by family , according to EMS patient satting 88% in field on room air, tachyneic. Patient abdomen distended. C/o chest pain, dizziness, weakness. Seen and treated for flu x 2 days ago on zpack currently. Hx 1 stent, HTN Placed on 4Ln Hx

## 2018-02-17 LAB
BACTERIA BLD CULT: SIGNIFICANT CHANGE UP
BACTERIA BLD CULT: SIGNIFICANT CHANGE UP
BUN SERPL-MCNC: 18 MG/DL — SIGNIFICANT CHANGE UP (ref 7–23)
CALCIUM SERPL-MCNC: 8.1 MG/DL — LOW (ref 8.4–10.5)
CHLORIDE SERPL-SCNC: 100 MMOL/L — SIGNIFICANT CHANGE UP (ref 98–107)
CHOLEST SERPL-MCNC: 69 MG/DL — LOW (ref 120–199)
CK MB BLD-MCNC: 1.18 NG/ML — SIGNIFICANT CHANGE UP (ref 1–6.6)
CK MB BLD-MCNC: SIGNIFICANT CHANGE UP (ref 0–2.5)
CK SERPL-CCNC: 140 U/L — SIGNIFICANT CHANGE UP (ref 30–200)
CO2 SERPL-SCNC: 24 MMOL/L — SIGNIFICANT CHANGE UP (ref 22–31)
CREAT SERPL-MCNC: 1.26 MG/DL — SIGNIFICANT CHANGE UP (ref 0.5–1.3)
GLUCOSE SERPL-MCNC: 163 MG/DL — HIGH (ref 70–99)
HCT VFR BLD CALC: 32.2 % — LOW (ref 39–50)
HDLC SERPL-MCNC: 26 MG/DL — LOW (ref 35–55)
HGB BLD-MCNC: 11 G/DL — LOW (ref 13–17)
LIPID PNL WITH DIRECT LDL SERPL: 28 MG/DL — SIGNIFICANT CHANGE UP
MAGNESIUM SERPL-MCNC: 1.8 MG/DL — SIGNIFICANT CHANGE UP (ref 1.6–2.6)
MCHC RBC-ENTMCNC: 29.3 PG — SIGNIFICANT CHANGE UP (ref 27–34)
MCHC RBC-ENTMCNC: 34.2 % — SIGNIFICANT CHANGE UP (ref 32–36)
MCV RBC AUTO: 85.6 FL — SIGNIFICANT CHANGE UP (ref 80–100)
NRBC # FLD: 0 — SIGNIFICANT CHANGE UP
PLATELET # BLD AUTO: 188 K/UL — SIGNIFICANT CHANGE UP (ref 150–400)
PMV BLD: 10 FL — SIGNIFICANT CHANGE UP (ref 7–13)
POTASSIUM SERPL-MCNC: 3.7 MMOL/L — SIGNIFICANT CHANGE UP (ref 3.5–5.3)
POTASSIUM SERPL-SCNC: 3.7 MMOL/L — SIGNIFICANT CHANGE UP (ref 3.5–5.3)
RBC # BLD: 3.76 M/UL — LOW (ref 4.2–5.8)
RBC # FLD: 13.5 % — SIGNIFICANT CHANGE UP (ref 10.3–14.5)
SODIUM SERPL-SCNC: 138 MMOL/L — SIGNIFICANT CHANGE UP (ref 135–145)
SPECIMEN SOURCE: SIGNIFICANT CHANGE UP
SPECIMEN SOURCE: SIGNIFICANT CHANGE UP
TRIGL SERPL-MCNC: 90 MG/DL — SIGNIFICANT CHANGE UP (ref 10–149)
TROPONIN T SERPL-MCNC: < 0.06 NG/ML — SIGNIFICANT CHANGE UP (ref 0–0.06)
TSH SERPL-MCNC: 1.56 UIU/ML — SIGNIFICANT CHANGE UP (ref 0.27–4.2)
WBC # BLD: 7.77 K/UL — SIGNIFICANT CHANGE UP (ref 3.8–10.5)
WBC # FLD AUTO: 7.77 K/UL — SIGNIFICANT CHANGE UP (ref 3.8–10.5)

## 2018-02-17 RX ORDER — FUROSEMIDE 40 MG
20 TABLET ORAL
Qty: 0 | Refills: 0 | Status: DISCONTINUED | OUTPATIENT
Start: 2018-02-17 | End: 2018-02-19

## 2018-02-17 RX ORDER — INFLUENZA VIRUS VACCINE 15; 15; 15; 15 UG/.5ML; UG/.5ML; UG/.5ML; UG/.5ML
0.5 SUSPENSION INTRAMUSCULAR ONCE
Qty: 0 | Refills: 0 | Status: DISCONTINUED | OUTPATIENT
Start: 2018-02-17 | End: 2018-02-20

## 2018-02-17 RX ADMIN — Medication 2: at 13:09

## 2018-02-17 RX ADMIN — Medication 20 MILLIGRAM(S): at 17:21

## 2018-02-17 RX ADMIN — Medication 30 MILLIGRAM(S): at 06:47

## 2018-02-17 RX ADMIN — ENOXAPARIN SODIUM 40 MILLIGRAM(S): 100 INJECTION SUBCUTANEOUS at 23:37

## 2018-02-17 RX ADMIN — Medication 4: at 17:55

## 2018-02-17 RX ADMIN — Medication 81 MILLIGRAM(S): at 13:09

## 2018-02-17 RX ADMIN — TAMSULOSIN HYDROCHLORIDE 0.4 MILLIGRAM(S): 0.4 CAPSULE ORAL at 22:50

## 2018-02-17 RX ADMIN — Medication 40 MILLIGRAM(S): at 06:28

## 2018-02-17 RX ADMIN — ENOXAPARIN SODIUM 40 MILLIGRAM(S): 100 INJECTION SUBCUTANEOUS at 00:35

## 2018-02-17 RX ADMIN — VALSARTAN 80 MILLIGRAM(S): 80 TABLET ORAL at 06:28

## 2018-02-17 RX ADMIN — ISOSORBIDE MONONITRATE 30 MILLIGRAM(S): 60 TABLET, EXTENDED RELEASE ORAL at 13:09

## 2018-02-17 RX ADMIN — Medication 2: at 09:22

## 2018-02-17 NOTE — PROGRESS NOTE ADULT - ASSESSMENT
85 y/o male with a PmHx of T2DM, HTN, CAD (stent placement in 2013), Afib (on coumadin), and recent dx of influenza A (d/c from [facility] 2/12/18) presents to ED with  RVP (+) for Influenza AH3, elevated BNP (1945) and 1 day hx of CP and SOB. CTPA reveals B/L pulmonary edema. Admit to telemetry to r/o ACS.      Problem/Plan - 1:  ·  Problem: Acute heart failure, unspecified heart failure type. Plan: Lower extremity swelling, inspiratory crackles, and elevated pro-BNP concerning for acute heart failure vs cardiomyopathy (no prior h/o heart failure per patient's report). Will initiate IV furosemide 40mg bid.     Patient does not know doses of medications so will start lowest doses of valsartan and nifedipine.     Precipitant of acute HF unclear. Cardiac enzymes wnl x1. No ST/T wave changes on ECG. Will monitor on telemetry and obtain 2nd set of CE. TTE ordered. c/w O2 NC to maintain O2 sat >90%.    c/w ASA given h/o CAD.     Problem/Plan - 2:  ·  Problem: Afib. Plan: HR at goal; not on rate controlling agent. Reportedly using Coumadin but INR is subtherapeutic. [id number] 4-5. No indication for bridging as day to day risk of stroke with chronic atrial fibrillation is low.    Will obtain TTE. If no evidence of significant valvular dz, will start on DOAC. Otherwise, will need to begin warfarin dosing tomorrow.     Problem/Plan - 3:  ·  Problem: Elevated alkaline phosphatase level. likely 2/2 CHF, monitor     Problem/Plan - 4:  ·  Problem: Diabetes. Plan: c/w ISS     Problem/Plan - 5:  ·  Problem: Hypertension. Plan: BP at goal 85 y/o male with a PmHx of T2DM, HTN, CAD (stent placement in 2013), Afib (on coumadin), and recent dx of influenza A (d/c from [facility] 2/12/18) presents to ED with  RVP (+) for Influenza AH3, elevated BNP (1945) and 1 day hx of CP and SOB. CTPA reveals B/L pulmonary edema. Admit to telemetry to r/o ACS.      Problem/Plan - 1:  ·  Problem: Acute heart failure, unspecified heart failure type. Plan: Improving w/diuresis, Cardio appreciated, c/w IV Lasix lower dose per Cardio    c/w ASA given h/o CAD.     Problem/Plan - 2:  ·  Problem: Afib. Plan: HR at goal, c/w Coumadin     Problem/Plan - 3:  ·  Problem: Elevated alkaline phosphatase level. likely 2/2 CHF, monitor     Problem/Plan - 4:  ·  Problem: Diabetes. Plan: c/w ISS     Problem/Plan - 5:  ·  Problem: Hypertension. Plan: BP at goal

## 2018-02-17 NOTE — CONSULT NOTE ADULT - SUBJECTIVE AND OBJECTIVE BOX
CARDIOLOGY CONSULT - Dr. Ann     CHIEF COMPLAINT: chest pain/ spb     HPI:  85 y/o male. with a PmHx as mentioned below  and recent dx of influenza AH3 (d/c from [facility] 2/12/18), presents to ED with 1 day hx of CP and SOB. Pt reports that at 5am this morning, he developed substernal, non-radiating, pleuritic CP with associated SOB while lying in bed. He describes the CP as "stabbing" and is worsened with inspiration and while lying flat. The SOB is exclusively associated with CP. Pt was unable to rate the pain on a 1-10 scale. Of note, pt states that this is his first episode of CP (did not have CP prior to stent placement 5 yr ago). He also reports generalized weakness, new-onset B/L LE edema, and worsening, non-productive cough. He states that he feels like he has mucus in his lungs but is unable to clear it. Denies dizziness/syncope, palpitations, fever, chills, diaphoresis, abdominal pain, N/V/D, changes in bowel or bladder habits. Denies personal or family hx of HF, stroke, or MI. Last cardiologist visit approx 1 yr ago, last stress/echo >3yr ago.         PAST MEDICAL & SURGICAL HISTORY:  CAD (coronary artery disease)  Hypertension  Hypercholesterolemia  Diabetes  History of heart artery stent: 2013          PREVIOUS DIAGNOSTIC TESTING:    [ x] Echocardiogram:  < from: Transthoracic Echocardiogram w/ Doppler (12.27.10 @ 09:00) >  Ejection Fraction: 75 %    < from: Transthoracic Echocardiogram w/ Doppler (12.27.10 @ 09:00) >  Conclusions:  1. Normal left ventricular internal dimensions and wall  thicknesses.  2. Normal left ventricular function.  3. Mild aortic regurgitation. Peak transaortic valve  gradient equals 22mm Hg, mean transaortic valve gradient  equals 12mm Hg, estimated aortic valve area equals 1.5 sqcm  (by continuity equation), consistent with mild to moderate  aortic stenosis.  ------------------------------------------------------------------------  Confirmed on  12/27/2010 - 12:37:01 by Carlos Badillo M.D.    < end of copied text >        [x ]  Catheterization:  < from: Cardiac Cath Lab (12.27.10 @ 14:33) >  Coronary vessels: The coronary circulation is right dominant.  LM:      LM: Normal.  LAD:   Proximal LAD: There was a 60 % stenosis.  Mid LAD: Angiography showed moderate atherosclerosis with no clinical  lesions appreciated.  D2: There was a 60 % stenosis.  CX:      Circumflex: Normal.  RCA:      RPDA: Angiography showed severe atherosclerosis.  Complications: There were no complications.  Recommendations:  Moderate CAD in LAD. Will evaluate for anterior wall ischemia with stress  nuclear    < end of copied text >    [ x] Stress Test:    < from: Nuclear Stress Test, Pharmacologic (12.28.10 @ 09:00) >  GATED ANALYSIS:  Post-stress gated imaging was performed  (LVEF = 64 %;  LVEDV = 78 ml.), revealing normal  LV function.  There is  no segmental wall motion abnormality.  RV function appears  normal.  ------------------------------------------------------------------------  IMPRESSIONS: Normal Study  * Negative Regadenoson ECG.  * Myocardial Perfusion SPECT results are normal.  * There is a small, very mild defect in inferior wall that  is fixed. Defect corrected with prone imaging.  * No clear evidence of ischemia or infarct.  * Post-stress gated imaging was performed  (LVEF = 64 %;  LVEDV = 78 ml.), revealing normal  LV function.  There is  no segmental wall motion abnormality.  RV function appears  normal.  ------------------------------------------------------------------------  Confirmed on  12/28/2010 - 15:05:39 by Duncan Martino M.D.  ------------------------------------------------------------------------    < end of copied text >        Home Medications:   * Outpatient Medication Status not yet specified  · 	glipiZIDE: Last Dose Taken:    · 	tamsulosin 0.4 mg oral capsule: 1 cap(s) orally once a day, Last Dose Taken:    · 	Coumadin: Last Dose Taken:    · 	Metformin: Last Dose Taken:    · 	NIFEdipine: Last Dose Taken:    · 	Vascepa: Last Dose Taken:    · 	isosorbide mononitrate: Last Dose Taken:    · 	Diovan: Last Dose Taken:     MEDICATIONS:  MEDICATIONS  (STANDING):  aspirin enteric coated 81 milliGRAM(s) Oral daily  dextrose 5%. 1000 milliLiter(s) (50 mL/Hr) IV Continuous <Continuous>  dextrose 50% Injectable 12.5 Gram(s) IV Push once  dextrose 50% Injectable 25 Gram(s) IV Push once  dextrose 50% Injectable 25 Gram(s) IV Push once  enoxaparin Injectable 40 milliGRAM(s) SubCutaneous every 24 hours  furosemide   Injectable 40 milliGRAM(s) IV Push two times a day  influenza   Vaccine 0.5 milliLiter(s) IntraMuscular once  insulin lispro (HumaLOG) corrective regimen sliding scale   SubCutaneous three times a day before meals  insulin lispro (HumaLOG) corrective regimen sliding scale   SubCutaneous at bedtime  isosorbide   mononitrate ER Tablet (IMDUR) 30 milliGRAM(s) Oral daily  NIFEdipine XL 30 milliGRAM(s) Oral daily  tamsulosin 0.4 milliGRAM(s) Oral at bedtime  valsartan 80 milliGRAM(s) Oral daily      FAMILY HISTORY:  No pertinent family history in first degree relatives      SOCIAL HISTORY:    [ ] Non-smoker  [ ] Smoker  [ ] Alcohol    Allergies    No Known Allergies    Intolerances    	    REVIEW OF SYSTEMS:  CONSTITUTIONAL: No fever, weight loss, or fatigue  EYES: No eye pain, visual disturbances, or discharge  ENMT:  No difficulty hearing, tinnitus, vertigo; No sinus or throat pain  NECK: No pain or stiffness  RESPIRATORY: No cough, wheezing, chills or hemoptysis; No Shortness of Breath  CARDIOVASCULAR: No chest pain, palpitations, passing out, dizziness, or leg swelling  GASTROINTESTINAL: No abdominal or epigastric pain. No nausea, vomiting, or hematemesis; No diarrhea or constipation. No melena or hematochezia.  GENITOURINARY: No dysuria, frequency, hematuria, or incontinence  NEUROLOGICAL: No headaches, memory loss, loss of strength, numbness, or tremors  SKIN: No itching, burning, rashes, or lesions   	    [ ] All others negative	  [ ] Unable to obtain    PHYSICAL EXAM:  T(C): 36.5 (02-17-18 @ 07:29), Max: 37.1 (02-16-18 @ 20:10)  HR: 63 (02-17-18 @ 07:29) (63 - 75)  BP: 125/59 (02-17-18 @ 07:29) (122/52 - 187/88)  RR: 16 (02-17-18 @ 07:29) (16 - 19)  SpO2: 96% (02-17-18 @ 07:29) (93% - 97%)  Wt(kg): --  I&O's Summary      Appearance: Normal	  Psychiatry: A & O x 3, Mood & affect appropriate  HEENT:   Normal oral mucosa, PERRL, EOMI	  Lymphatic: No lymphadenopathy  Cardiovascular: Normal S1 S2,RRR, No JVD, No murmurs  Respiratory: Lungs clear to auscultation	  Gastrointestinal:  Soft, Non-tender, + BS	  Skin: No rashes, No ecchymoses, No cyanosis	  Neurologic: Non-focal  Extremities: Normal range of motion, No clubbing, cyanosis or edema  Vascular: Peripheral pulses palpable 2+ bilaterally    TELEMETRY: 	    ECG:  	  RADIOLOGY:  OTHER: 	  < from: CT Angio Chest w/ IV Cont (02.16.18 @ 11:52) >  Impression: No pulmonary embolus is noted within the main, right and left   main, lobar and proximal segmental pulmonary artery branches. Evaluation   of themid to distal segmental and subsegmental pulmonary artery branches   is limited.    Findings suggestive of pulmonary edema.        --------------------------------------------------------------------------------------------------  < from: Xray Chest 1 View- PORTABLE-Urgent (02.16.18 @ 10:40) >  IMPRESSION:  New small bilateral pleural effusions.    Slightly hazy indistinct lung markings and hilar shadows compatible with   developed mild congestion at edema. No pneumothorax.    Stable cardiac and mediastinal silhouettes including aortic   calcifications.    Trachea midline.    Generalized osteopenia and spinal degenerative changes again noted.    Also correlate with findings on already pending chest CTA.          < end of copied text >  	  LABS:	 	    CARDIAC MARKERS:                                  11.0   7.77  )-----------( 188      ( 17 Feb 2018 06:00 )             32.2     02-17    138  |  100  |  18  ----------------------------<  163<H>  3.7   |  24  |  1.26    Ca    8.1<L>      17 Feb 2018 06:00  Mg     1.8     02-17    TPro  6.6  /  Alb  3.7  /  TBili  0.9  /  DBili  x   /  AST  34  /  ALT  41  /  AlkPhos  204<H>  02-16    PT/INR - ( 16 Feb 2018 08:45 )   PT: 11.5 SEC;   INR: 1.04          PTT - ( 16 Feb 2018 08:45 )  PTT:33.6 SEC  proBNP:   Lipid Profile:   HgA1c:   TSH: CARDIOLOGY CONSULT - Dr. Ann     CHIEF COMPLAINT: chest pain/ spb     HPI:  87 y/o male. with a PmHx as mentioned below  and recent dx of influenza AH3 (d/c from [facility] 2/12/18), presents to ED with 1 day hx of CP and SOB. Pt reports that at 5am this morning, he developed substernal, non-radiating, pleuritic CP with associated SOB while lying in bed. He describes the CP as "stabbing" and is worsened with inspiration and while lying flat. The SOB is exclusively associated with CP. Pt was unable to rate the pain on a 1-10 scale.  Patient reports he did not have CP prior to stent placement 5 yr ago.  Upon exam patient denied chest pain. He also reports generalized weakness, new-onset B/L LE edema, and worsening, non-productive cough. He states that he feels like he has mucus in his lungs but is unable to clear it. Denies dizziness/syncope, palpitations, fever, chills, diaphoresis, abdominal pain, N/V/D, changes in bowel or bladder habits. Denies personal or family hx of HF, stroke, or MI. Last cardiologist visit approx 1 yr ago, last stress/echo >3yr ago.  last echo from 2010 revealing normal LV fx, mod AS . ROS otherwise negative.         PAST MEDICAL & SURGICAL HISTORY:  CAD (coronary artery disease)  Hypertension  Hypercholesterolemia  Diabetes  History of heart artery stent: 2013          PREVIOUS DIAGNOSTIC TESTING:    [ x] Echocardiogram:  < from: Transthoracic Echocardiogram w/ Doppler (12.27.10 @ 09:00) >  Ejection Fraction: 75 %    < from: Transthoracic Echocardiogram w/ Doppler (12.27.10 @ 09:00) >  Conclusions:  1. Normal left ventricular internal dimensions and wall  thicknesses.  2. Normal left ventricular function.  3. Mild aortic regurgitation. Peak transaortic valve  gradient equals 22mm Hg, mean transaortic valve gradient  equals 12mm Hg, estimated aortic valve area equals 1.5 sqcm  (by continuity equation), consistent with mild to moderate  aortic stenosis.  ------------------------------------------------------------------------  Confirmed on  12/27/2010 - 12:37:01 by Carlos Badillo M.D.    < end of copied text >        [x ]  Catheterization:  < from: Cardiac Cath Lab (12.27.10 @ 14:33) >  Coronary vessels: The coronary circulation is right dominant.  LM:      LM: Normal.  LAD:   Proximal LAD: There was a 60 % stenosis.  Mid LAD: Angiography showed moderate atherosclerosis with no clinical  lesions appreciated.  D2: There was a 60 % stenosis.  CX:      Circumflex: Normal.  RCA:      RPDA: Angiography showed severe atherosclerosis.  Complications: There were no complications.  Recommendations:  Moderate CAD in LAD. Will evaluate for anterior wall ischemia with stress  nuclear    < end of copied text >    [ x] Stress Test:    < from: Nuclear Stress Test, Pharmacologic (12.28.10 @ 09:00) >  GATED ANALYSIS:  Post-stress gated imaging was performed  (LVEF = 64 %;  LVEDV = 78 ml.), revealing normal  LV function.  There is  no segmental wall motion abnormality.  RV function appears  normal.  ------------------------------------------------------------------------  IMPRESSIONS: Normal Study  * Negative Regadenoson ECG.  * Myocardial Perfusion SPECT results are normal.  * There is a small, very mild defect in inferior wall that  is fixed. Defect corrected with prone imaging.  * No clear evidence of ischemia or infarct.  * Post-stress gated imaging was performed  (LVEF = 64 %;  LVEDV = 78 ml.), revealing normal  LV function.  There is  no segmental wall motion abnormality.  RV function appears  normal.  ------------------------------------------------------------------------  Confirmed on  12/28/2010 - 15:05:39 by Duncan Martino M.D.  ------------------------------------------------------------------------    < end of copied text >        Home Medications:   * Outpatient Medication Status not yet specified  · 	glipiZIDE: Last Dose Taken:    · 	tamsulosin 0.4 mg oral capsule: 1 cap(s) orally once a day, Last Dose Taken:    · 	Coumadin: Last Dose Taken:    · 	Metformin: Last Dose Taken:    · 	NIFEdipine: Last Dose Taken:    · 	Vascepa: Last Dose Taken:    · 	isosorbide mononitrate: Last Dose Taken:    · 	Diovan: Last Dose Taken:     MEDICATIONS:  MEDICATIONS  (STANDING):  aspirin enteric coated 81 milliGRAM(s) Oral daily  dextrose 5%. 1000 milliLiter(s) (50 mL/Hr) IV Continuous <Continuous>  dextrose 50% Injectable 12.5 Gram(s) IV Push once  dextrose 50% Injectable 25 Gram(s) IV Push once  dextrose 50% Injectable 25 Gram(s) IV Push once  enoxaparin Injectable 40 milliGRAM(s) SubCutaneous every 24 hours  furosemide   Injectable 40 milliGRAM(s) IV Push two times a day  influenza   Vaccine 0.5 milliLiter(s) IntraMuscular once  insulin lispro (HumaLOG) corrective regimen sliding scale   SubCutaneous three times a day before meals  insulin lispro (HumaLOG) corrective regimen sliding scale   SubCutaneous at bedtime  isosorbide   mononitrate ER Tablet (IMDUR) 30 milliGRAM(s) Oral daily  NIFEdipine XL 30 milliGRAM(s) Oral daily  tamsulosin 0.4 milliGRAM(s) Oral at bedtime  valsartan 80 milliGRAM(s) Oral daily      FAMILY HISTORY:  No pertinent family history in first degree relatives      SOCIAL HISTORY:    [ x] Non-smoker  [ ] Smoker  [ ] Alcohol    Allergies    No Known Allergies    Intolerances    	    REVIEW OF SYSTEMS:  CONSTITUTIONAL: No fever, weight loss, or fatigue  EYES: No eye pain, visual disturbances, or discharge  ENMT:  No difficulty hearing, tinnitus, vertigo; No sinus or throat pain  NECK: No pain or stiffness  RESPIRATORY: No , wheezing, chills or hemoptysis;  + Shortness of Breath, cough  CARDIOVASCULAR: No chest pain, palpitations, passing out, dizziness, + leg swelling  GASTROINTESTINAL: No abdominal or epigastric pain. No nausea, vomiting, or hematemesis; No diarrhea or constipation. No melena or hematochezia.  GENITOURINARY: No dysuria, frequency, hematuria, or incontinence  NEUROLOGICAL: No headaches, memory loss, loss of strength, numbness, or tremors  SKIN: No itching, burning, rashes, or lesions   	    [ x] All others negative	  [ ] Unable to obtain    PHYSICAL EXAM:  T(C): 36.5 (02-17-18 @ 07:29), Max: 37.1 (02-16-18 @ 20:10)  HR: 63 (02-17-18 @ 07:29) (63 - 75)  BP: 125/59 (02-17-18 @ 07:29) (122/52 - 187/88)  RR: 16 (02-17-18 @ 07:29) (16 - 19)  SpO2: 96% (02-17-18 @ 07:29) (93% - 97%)  Wt(kg): --  I&O's Summary      Appearance: Normal	  Psychiatry: A & O x 3, Mood & affect appropriate  HEENT:   Normal oral mucosa, PERRL, EOMI	  Lymphatic: No lymphadenopathy  Cardiovascular: Normal S1 S2,irregular + sys murmurs  Respiratory: rhonchi, exp wheeze   Gastrointestinal:  distended , Non-tender, + BS	  Skin: No rashes, No ecchymoses, No cyanosis	  Neurologic: Non-focal  Extremities: Normal range of motion, + 1-2 bl LE  edema  Vascular: Peripheral pulses palpable 2+ bilaterally    TELEMETRY: afib 	    ECG:  Afib 76	  RADIOLOGY:  OTHER: 	  < from: CT Angio Chest w/ IV Cont (02.16.18 @ 11:52) >  Impression: No pulmonary embolus is noted within the main, right and left   main, lobar and proximal segmental pulmonary artery branches. Evaluation   of themid to distal segmental and subsegmental pulmonary artery branches   is limited.    Findings suggestive of pulmonary edema.        --------------------------------------------------------------------------------------------------  < from: Xray Chest 1 View- PORTABLE-Urgent (02.16.18 @ 10:40) >  IMPRESSION:  New small bilateral pleural effusions.    Slightly hazy indistinct lung markings and hilar shadows compatible with   developed mild congestion at edema. No pneumothorax.    Stable cardiac and mediastinal silhouettes including aortic   calcifications.    Trachea midline.    Generalized osteopenia and spinal degenerative changes again noted.    Also correlate with findings on already pending chest CTA.          < end of copied text >  	  LABS:	 	    CARDIAC MARKERS:                                  11.0   7.77  )-----------( 188      ( 17 Feb 2018 06:00 )             32.2     02-17    138  |  100  |  18  ----------------------------<  163<H>  3.7   |  24  |  1.26    Ca    8.1<L>      17 Feb 2018 06:00  Mg     1.8     02-17    TPro  6.6  /  Alb  3.7  /  TBili  0.9  /  DBili  x   /  AST  34  /  ALT  41  /  AlkPhos  204<H>  02-16    PT/INR - ( 16 Feb 2018 08:45 )   PT: 11.5 SEC;   INR: 1.04          PTT - ( 16 Feb 2018 08:45 )  PTT:33.6 SEC  proBNP:   Lipid Profile:   HgA1c:   TSH:

## 2018-02-17 NOTE — CONSULT NOTE ADULT - ASSESSMENT
85 y/o male. with a PmHx of T2DM, HTN, CAD (stent placement in 2013), Afib (on coumadin), and recent dx of influenza AH3 (d/c from [facility] 2/12/18), admitted with CP and SOB 85 y/o male. with a PmHx of T2DM, HTN, CAD (stent placement in 2013), Afib (on coumadin), and recent dx of influenza AH3 (d/c from [facility] 2/12/18), admitted with CP and SOB    1. acute CHF   likely in the setting of viral illness/ AS   chest xray revealing bl pleural effusions   CTA chest  neg for PE , + pulm edema   check echo to eval lv fx / and aortic valve    evidence of fluid overload on exam   decrease  lasix IVP 20 mg BID   ekg revealing no evidence of ACS     2. Atypical chest pain   pleuritic in nature , likely secondary to CHF/ viral illness   currently chest pain free   chest xray result as mentioned above   cv stable   no evidence of acute ischemia/ACS  check echo   continue ASA ( hx cad/pci)     3. Afib   chronic   rate controlled , continue CCB   CHADS 4 A/C  resume coumadin     4. HTN   bp acceptable  continue  current antihtn meds

## 2018-02-17 NOTE — PROGRESS NOTE ADULT - SUBJECTIVE AND OBJECTIVE BOX
CHIEF COMPLAINT:Patient is a 86y old  Male who presents with a chief complaint of "CP & SOB" x 1 day (16 Feb 2018 14:40)    	  Interval history: SOB improved      Allergies:  No Known Allergies      PAST MEDICAL & SURGICAL HISTORY:  CAD (coronary artery disease)  Hypertension  Hypercholesterolemia  Diabetes  History of heart artery stent: 2013      FAMILY HISTORY:  No pertinent family history in first degree relatives      REVIEW OF SYSTEMS:  CONSTITUTIONAL: No fever, weight loss, or fatigue  EYES: No eye pain, visual disturbances, or discharge  NECK: No pain or stiffness  RESPIRATORY: + cough, no wheezing, less shortness of breath  CARDIOVASCULAR: No chest pain, palpitations, dizziness, or leg swelling  GASTROINTESTINAL: No abdominal or epigastric pain. No nausea, vomiting, diarrhea or constipation  GENITOURINARY: No dysuria, urinary frequency or urgency, no hematuria  NEUROLOGICAL: No headaches, memory loss, loss of strength, numbness, or tremors  SKIN: No itching, burning, rashes, or lesions   MUSCULOSKELETAL: No joint pain or swelling; No muscle, back, or extremity pain    Medications:  MEDICATIONS  (STANDING):  aspirin enteric coated 81 milliGRAM(s) Oral daily  dextrose 5%. 1000 milliLiter(s) (50 mL/Hr) IV Continuous <Continuous>  dextrose 50% Injectable 12.5 Gram(s) IV Push once  dextrose 50% Injectable 25 Gram(s) IV Push once  dextrose 50% Injectable 25 Gram(s) IV Push once  enoxaparin Injectable 40 milliGRAM(s) SubCutaneous every 24 hours  furosemide   Injectable 20 milliGRAM(s) IV Push two times a day  influenza   Vaccine 0.5 milliLiter(s) IntraMuscular once  insulin lispro (HumaLOG) corrective regimen sliding scale   SubCutaneous three times a day before meals  insulin lispro (HumaLOG) corrective regimen sliding scale   SubCutaneous at bedtime  isosorbide   mononitrate ER Tablet (IMDUR) 30 milliGRAM(s) Oral daily  NIFEdipine XL 30 milliGRAM(s) Oral daily  tamsulosin 0.4 milliGRAM(s) Oral at bedtime  valsartan 80 milliGRAM(s) Oral daily    MEDICATIONS  (PRN):  acetaminophen   Tablet 650 milliGRAM(s) Oral every 6 hours PRN For Temp greater than 38 C (100.4 F)  acetaminophen   Tablet. 650 milliGRAM(s) Oral every 6 hours PRN mild, moderate pain  dextrose Gel 1 Dose(s) Oral once PRN Blood Glucose LESS THAN 70 milliGRAM(s)/deciliter  glucagon  Injectable 1 milliGRAM(s) IntraMuscular once PRN Glucose LESS THAN 70 milligrams/deciliter    	    PHYSICAL EXAM:  T(C): 36.8 (02-17-18 @ 13:00), Max: 37.1 (02-16-18 @ 23:24)  HR: 62 (02-17-18 @ 17:15) (62 - 66)  BP: 134/74 (02-17-18 @ 17:15) (124/66 - 135/70)  RR: 17 (02-17-18 @ 13:00) (16 - 17)  SpO2: 95% (02-17-18 @ 13:00) (95% - 97%)  Wt(kg): --  I&O's Summary      Appearance: Normal	  HEENT:   NCAT, PERRL, EOMI	  Lymphatic: No lymphadenopathy  Cardiovascular: Normal S1 S2, RRR  Respiratory: Lungs clear to auscultation BL  Psychiatry: A & O x 3, Mood & affect appropriate  Gastrointestinal:  Soft, Non-tender, + BS  Skin: No rashes, No ecchymoses, No cyanosis	  Neurologic: Non-focal  Extremities: Normal range of motion, No clubbing, cyanosis or edema    	  LABS:	 	    CARDIAC MARKERS:  CARDIAC MARKERS ( 17 Feb 2018 06:00 )  x     / < 0.06 ng/mL / 140 u/L / 1.18 ng/mL / x      CARDIAC MARKERS ( 16 Feb 2018 08:45 )  x     / < 0.06 ng/mL / 116 u/L / 2.09 ng/mL / x                                    11.0   7.77  )-----------( 188      ( 17 Feb 2018 06:00 )             32.2     02-17    138  |  100  |  18  ----------------------------<  163<H>  3.7   |  24  |  1.26    Ca    8.1<L>      17 Feb 2018 06:00  Mg     1.8     02-17    TPro  6.6  /  Alb  3.7  /  TBili  0.9  /  DBili  x   /  AST  34  /  ALT  41  /  AlkPhos  204<H>  02-16    proBNP:   Lipid Profile:   HgA1c:   TSH: Thyroid Stimulating Hormone, Serum: 1.56 uIU/mL (02-17 @ 06:00)

## 2018-02-18 RX ORDER — WARFARIN SODIUM 2.5 MG/1
5 TABLET ORAL ONCE
Qty: 0 | Refills: 0 | Status: COMPLETED | OUTPATIENT
Start: 2018-02-18 | End: 2018-02-18

## 2018-02-18 RX ADMIN — VALSARTAN 80 MILLIGRAM(S): 80 TABLET ORAL at 06:15

## 2018-02-18 RX ADMIN — WARFARIN SODIUM 5 MILLIGRAM(S): 2.5 TABLET ORAL at 17:15

## 2018-02-18 RX ADMIN — Medication 30 MILLIGRAM(S): at 05:50

## 2018-02-18 RX ADMIN — ISOSORBIDE MONONITRATE 30 MILLIGRAM(S): 60 TABLET, EXTENDED RELEASE ORAL at 13:14

## 2018-02-18 RX ADMIN — Medication 6: at 13:14

## 2018-02-18 RX ADMIN — Medication 81 MILLIGRAM(S): at 13:14

## 2018-02-18 RX ADMIN — Medication 20 MILLIGRAM(S): at 05:50

## 2018-02-18 RX ADMIN — Medication 20 MILLIGRAM(S): at 17:15

## 2018-02-18 RX ADMIN — Medication 8: at 18:04

## 2018-02-18 RX ADMIN — Medication 4: at 09:14

## 2018-02-18 RX ADMIN — TAMSULOSIN HYDROCHLORIDE 0.4 MILLIGRAM(S): 0.4 CAPSULE ORAL at 22:59

## 2018-02-18 NOTE — PROGRESS NOTE ADULT - ASSESSMENT
87 y/o male with a PmHx of T2DM, HTN, CAD (stent placement in 2013), Afib (on coumadin), and recent dx of influenza A (d/c from [facility] 2/12/18) presents to ED with  RVP (+) for Influenza AH3, elevated BNP (1945) and 1 day hx of CP and SOB. CTPA reveals B/L pulmonary edema. Admit to telemetry to r/o ACS.      Problem/Plan - 1:  ·  Problem: Acute heart failure, unspecified heart failure type. Plan: Improving w/diuresis, Cardio appreciated, c/w IV Lasix lower dose per Cardio, transition to PO tomorrow  c/w ASA given h/o CAD.     Problem/Plan - 2:  ·  Problem: Afib. Plan: HR at goal, c/w Coumadin     Problem/Plan - 3:  ·  Problem: Elevated alkaline phosphatase level. likely 2/2 CHF, monitor     Problem/Plan - 4:  ·  Problem: Diabetes. Plan: c/w ISS     Problem/Plan - 5:  ·  Problem: Hypertension. Plan: BP at goal

## 2018-02-18 NOTE — PROGRESS NOTE ADULT - SUBJECTIVE AND OBJECTIVE BOX
CHIEF COMPLAINT:Patient is a 86y old  Male who presents with a chief complaint of "CP & SOB" x 1 day (16 Feb 2018 14:40)    	  Interval history: feeling well      Allergies:  No Known Allergies      PAST MEDICAL & SURGICAL HISTORY:  CAD (coronary artery disease)  Hypertension  Hypercholesterolemia  Diabetes  History of heart artery stent: 2013      FAMILY HISTORY:  No pertinent family history in first degree relatives      REVIEW OF SYSTEMS:  CONSTITUTIONAL: No fever, weight loss, or fatigue  EYES: No eye pain, visual disturbances, or discharge  NECK: No pain or stiffness  RESPIRATORY: less cough, no wheezing, no shortness of breath  CARDIOVASCULAR: No chest pain, palpitations, dizziness, or leg swelling  GASTROINTESTINAL: No abdominal or epigastric pain. No nausea, vomiting, diarrhea or constipation  GENITOURINARY: No dysuria, urinary frequency or urgency, no hematuria  NEUROLOGICAL: No headaches, memory loss, loss of strength, numbness, or tremors  SKIN: No itching, burning, rashes, or lesions   MUSCULOSKELETAL: No joint pain or swelling; No muscle, back, or extremity pain    Medications:  MEDICATIONS  (STANDING):  aspirin enteric coated 81 milliGRAM(s) Oral daily  dextrose 5%. 1000 milliLiter(s) (50 mL/Hr) IV Continuous <Continuous>  dextrose 50% Injectable 12.5 Gram(s) IV Push once  dextrose 50% Injectable 25 Gram(s) IV Push once  dextrose 50% Injectable 25 Gram(s) IV Push once  enoxaparin Injectable 40 milliGRAM(s) SubCutaneous every 24 hours  furosemide   Injectable 20 milliGRAM(s) IV Push two times a day  influenza   Vaccine 0.5 milliLiter(s) IntraMuscular once  insulin lispro (HumaLOG) corrective regimen sliding scale   SubCutaneous three times a day before meals  insulin lispro (HumaLOG) corrective regimen sliding scale   SubCutaneous at bedtime  isosorbide   mononitrate ER Tablet (IMDUR) 30 milliGRAM(s) Oral daily  NIFEdipine XL 30 milliGRAM(s) Oral daily  tamsulosin 0.4 milliGRAM(s) Oral at bedtime  valsartan 80 milliGRAM(s) Oral daily    MEDICATIONS  (PRN):  acetaminophen   Tablet 650 milliGRAM(s) Oral every 6 hours PRN For Temp greater than 38 C (100.4 F)  acetaminophen   Tablet. 650 milliGRAM(s) Oral every 6 hours PRN mild, moderate pain  dextrose Gel 1 Dose(s) Oral once PRN Blood Glucose LESS THAN 70 milliGRAM(s)/deciliter  glucagon  Injectable 1 milliGRAM(s) IntraMuscular once PRN Glucose LESS THAN 70 milligrams/deciliter    	    PHYSICAL EXAM:  T(C): 36.8 (02-17-18 @ 13:00), Max: 37.1 (02-16-18 @ 23:24)  HR: 62 (02-17-18 @ 17:15) (62 - 66)  BP: 134/74 (02-17-18 @ 17:15) (124/66 - 135/70)  RR: 17 (02-17-18 @ 13:00) (16 - 17)  SpO2: 95% (02-17-18 @ 13:00) (95% - 97%)  Wt(kg): --  I&O's Summary      Appearance: Normal	  HEENT:   NCAT, PERRL, EOMI	  Lymphatic: No lymphadenopathy  Cardiovascular: Normal S1 S2, RRR  Respiratory: Lungs clear to auscultation BL  Psychiatry: A & O x 3, Mood & affect appropriate  Gastrointestinal:  Soft, Non-tender, + BS  Skin: No rashes, No ecchymoses, No cyanosis	  Neurologic: Non-focal  Extremities: Normal range of motion, No clubbing, cyanosis or edema    	  LABS:	 	    CARDIAC MARKERS:  CARDIAC MARKERS ( 17 Feb 2018 06:00 )  x     / < 0.06 ng/mL / 140 u/L / 1.18 ng/mL / x      CARDIAC MARKERS ( 16 Feb 2018 08:45 )  x     / < 0.06 ng/mL / 116 u/L / 2.09 ng/mL / x                                    11.0   7.77  )-----------( 188      ( 17 Feb 2018 06:00 )             32.2     02-17    138  |  100  |  18  ----------------------------<  163<H>  3.7   |  24  |  1.26    Ca    8.1<L>      17 Feb 2018 06:00  Mg     1.8     02-17    TPro  6.6  /  Alb  3.7  /  TBili  0.9  /  DBili  x   /  AST  34  /  ALT  41  /  AlkPhos  204<H>  02-16    proBNP:   Lipid Profile:   HgA1c:   TSH: Thyroid Stimulating Hormone, Serum: 1.56 uIU/mL (02-17 @ 06:00)

## 2018-02-18 NOTE — PROGRESS NOTE ADULT - SUBJECTIVE AND OBJECTIVE BOX
CARDIOLOGY FOLLOW UP - Dr. Ann    CC no chest pain or sob        PHYSICAL EXAM:  T(C): 36.3 (02-18-18 @ 05:47), Max: 36.8 (02-17-18 @ 13:00)  HR: 67 (02-18-18 @ 05:47) (62 - 67)  BP: 121/61 (02-18-18 @ 05:47) (121/61 - 137/58)  RR: 17 (02-18-18 @ 05:47) (17 - 17)  SpO2: 94% (02-18-18 @ 05:47) (92% - 95%)  Wt(kg): --  I&O's Summary    17 Feb 2018 07:01  -  18 Feb 2018 07:00  --------------------------------------------------------  IN: 100 mL / OUT: 0 mL / NET: 100 mL        Appearance: Normal	  Cardiovascular: Normal S1 S2,RRR, No JVD, No murmurs  Respiratory: Lungs clear to auscultation	  Gastrointestinal:  Soft, Non-tender, + BS	  Extremities: Normal range of motion, No clubbing, cyanosis or edema        MEDICATIONS  (STANDING):  aspirin enteric coated 81 milliGRAM(s) Oral daily  dextrose 5%. 1000 milliLiter(s) (50 mL/Hr) IV Continuous <Continuous>  dextrose 50% Injectable 12.5 Gram(s) IV Push once  dextrose 50% Injectable 25 Gram(s) IV Push once  dextrose 50% Injectable 25 Gram(s) IV Push once  enoxaparin Injectable 40 milliGRAM(s) SubCutaneous every 24 hours  furosemide   Injectable 20 milliGRAM(s) IV Push two times a day  influenza   Vaccine 0.5 milliLiter(s) IntraMuscular once  insulin lispro (HumaLOG) corrective regimen sliding scale   SubCutaneous three times a day before meals  insulin lispro (HumaLOG) corrective regimen sliding scale   SubCutaneous at bedtime  isosorbide   mononitrate ER Tablet (IMDUR) 30 milliGRAM(s) Oral daily  NIFEdipine XL 30 milliGRAM(s) Oral daily  tamsulosin 0.4 milliGRAM(s) Oral at bedtime  valsartan 80 milliGRAM(s) Oral daily      TELEMETRY: Afib 	    ECG:  	  RADIOLOGY:   DIAGNOSTIC TESTING:  [ ] Echocardiogram: pending   [ ]  Catheterization:  [ ] Stress Test:    OTHER: 	    LABS:	 	                                11.0   7.77  )-----------( 188      ( 17 Feb 2018 06:00 )             32.2     02-17    138  |  100  |  18  ----------------------------<  163<H>  3.7   |  24  |  1.26    Ca    8.1<L>      17 Feb 2018 06:00  Mg     1.8     02-17 CARDIOLOGY FOLLOW UP - Dr. Ann    CC no chest pain or sob        PHYSICAL EXAM:  T(C): 36.3 (02-18-18 @ 05:47), Max: 36.8 (02-17-18 @ 13:00)  HR: 67 (02-18-18 @ 05:47) (62 - 67)  BP: 121/61 (02-18-18 @ 05:47) (121/61 - 137/58)  RR: 17 (02-18-18 @ 05:47) (17 - 17)  SpO2: 94% (02-18-18 @ 05:47) (92% - 95%)  Wt(kg): --  I&O's Summary    17 Feb 2018 07:01  -  18 Feb 2018 07:00  --------------------------------------------------------  IN: 100 mL / OUT: 0 mL / NET: 100 mL        Appearance: Normal	  Cardiovascular: Normal S1 S2,irregular , + sys  murmurs  Respiratory: Lungs clear to auscultation / diminished at base  	  Gastrointestinal:  Soft, Non-tender, + BS	  Extremities: Normal range of motion, No clubbing, cyanosis or edema        MEDICATIONS  (STANDING):  aspirin enteric coated 81 milliGRAM(s) Oral daily  dextrose 5%. 1000 milliLiter(s) (50 mL/Hr) IV Continuous <Continuous>  dextrose 50% Injectable 12.5 Gram(s) IV Push once  dextrose 50% Injectable 25 Gram(s) IV Push once  dextrose 50% Injectable 25 Gram(s) IV Push once  enoxaparin Injectable 40 milliGRAM(s) SubCutaneous every 24 hours  furosemide   Injectable 20 milliGRAM(s) IV Push two times a day  influenza   Vaccine 0.5 milliLiter(s) IntraMuscular once  insulin lispro (HumaLOG) corrective regimen sliding scale   SubCutaneous three times a day before meals  insulin lispro (HumaLOG) corrective regimen sliding scale   SubCutaneous at bedtime  isosorbide   mononitrate ER Tablet (IMDUR) 30 milliGRAM(s) Oral daily  NIFEdipine XL 30 milliGRAM(s) Oral daily  tamsulosin 0.4 milliGRAM(s) Oral at bedtime  valsartan 80 milliGRAM(s) Oral daily      TELEMETRY: Afib 	    ECG:  	  RADIOLOGY:   DIAGNOSTIC TESTING:  [ ] Echocardiogram: pending   [ ]  Catheterization:  [ ] Stress Test:    OTHER: 	    LABS:	 	                                11.0   7.77  )-----------( 188      ( 17 Feb 2018 06:00 )             32.2     02-17    138  |  100  |  18  ----------------------------<  163<H>  3.7   |  24  |  1.26    Ca    8.1<L>      17 Feb 2018 06:00  Mg     1.8     02-17

## 2018-02-18 NOTE — PROGRESS NOTE ADULT - ASSESSMENT
87 y/o male. with a PmHx of T2DM, HTN, CAD (stent placement in 2013), Afib (on coumadin), and recent dx of influenza AH3 (d/c from [facility] 2/12/18), admitted with CP and SOB    1. acute CHF   likely in the setting of viral illness/ AS   chest xray revealing bl pleural effusions   CTA chest  neg for PE , + pulm edema   check echo to eval lv fx / and aortic valve    evidence of fluid overload on exam   decrease  lasix IVP 20 mg BID   ekg revealing no evidence of ACS     2. Atypical chest pain   pleuritic in nature , likely secondary to CHF/ viral illness   currently chest pain free   chest xray result as mentioned above   cv stable   no evidence of acute ischemia/ACS  check echo   continue ASA ( hx cad/pci)     3. Afib   chronic   rate controlled , continue CCB   CHADS 4 A/C  resume coumadin     4. HTN   bp acceptable  continue  current antihtn meds 87 y/o male. with a PmHx of T2DM, HTN, CAD (stent placement in 2013), Afib (on coumadin), and recent dx of influenza AH3 (d/c from [facility] 2/12/18), admitted with CP and SOB    1. acute CHF   likely in the setting of viral illness/ AS   chest xray revealing bl pleural effusions   CTA chest  neg for PE , + pulm edema   check echo to eval lv fx / and aortic valve    clinically improving   continue lasix IVP 20 mg BID , change to PO tomorrow   ekg revealing no evidence of ACS     2. Atypical chest pain   pleuritic in nature , likely secondary to CHF/ viral illness   currently chest pain free   chest xray result as mentioned above   cv stable   no evidence of acute ischemia/ACS  pending echo   continue ASA ( hx cad/pci)     3. Afib   chronic   rate controlled , continue CCB   CHADS 4 A/C  resume coumadin     4. HTN   bp acceptable  continue  current antihtn meds

## 2018-02-19 LAB
APTT BLD: 38 SEC — HIGH (ref 27.5–37.4)
BUN SERPL-MCNC: 19 MG/DL — SIGNIFICANT CHANGE UP (ref 7–23)
CALCIUM SERPL-MCNC: 8.3 MG/DL — LOW (ref 8.4–10.5)
CHLORIDE SERPL-SCNC: 103 MMOL/L — SIGNIFICANT CHANGE UP (ref 98–107)
CO2 SERPL-SCNC: 22 MMOL/L — SIGNIFICANT CHANGE UP (ref 22–31)
CREAT SERPL-MCNC: 1.19 MG/DL — SIGNIFICANT CHANGE UP (ref 0.5–1.3)
GLUCOSE SERPL-MCNC: 195 MG/DL — HIGH (ref 70–99)
HCT VFR BLD CALC: 34.4 % — LOW (ref 39–50)
HGB BLD-MCNC: 11.4 G/DL — LOW (ref 13–17)
INR BLD: 1.06 — SIGNIFICANT CHANGE UP (ref 0.88–1.17)
MAGNESIUM SERPL-MCNC: 2 MG/DL — SIGNIFICANT CHANGE UP (ref 1.6–2.6)
MCHC RBC-ENTMCNC: 28.7 PG — SIGNIFICANT CHANGE UP (ref 27–34)
MCHC RBC-ENTMCNC: 33.1 % — SIGNIFICANT CHANGE UP (ref 32–36)
MCV RBC AUTO: 86.6 FL — SIGNIFICANT CHANGE UP (ref 80–100)
NRBC # FLD: 0 — SIGNIFICANT CHANGE UP
PLATELET # BLD AUTO: 296 K/UL — SIGNIFICANT CHANGE UP (ref 150–400)
PMV BLD: 10 FL — SIGNIFICANT CHANGE UP (ref 7–13)
POTASSIUM SERPL-MCNC: 3.6 MMOL/L — SIGNIFICANT CHANGE UP (ref 3.5–5.3)
POTASSIUM SERPL-SCNC: 3.6 MMOL/L — SIGNIFICANT CHANGE UP (ref 3.5–5.3)
PROTHROM AB SERPL-ACNC: 12.2 SEC — SIGNIFICANT CHANGE UP (ref 9.8–13.1)
RBC # BLD: 3.97 M/UL — LOW (ref 4.2–5.8)
RBC # FLD: 13.2 % — SIGNIFICANT CHANGE UP (ref 10.3–14.5)
SODIUM SERPL-SCNC: 139 MMOL/L — SIGNIFICANT CHANGE UP (ref 135–145)
WBC # BLD: 6.23 K/UL — SIGNIFICANT CHANGE UP (ref 3.8–10.5)
WBC # FLD AUTO: 6.23 K/UL — SIGNIFICANT CHANGE UP (ref 3.8–10.5)

## 2018-02-19 RX ORDER — SENNA PLUS 8.6 MG/1
2 TABLET ORAL AT BEDTIME
Qty: 0 | Refills: 0 | Status: DISCONTINUED | OUTPATIENT
Start: 2018-02-19 | End: 2018-02-20

## 2018-02-19 RX ORDER — POTASSIUM CHLORIDE 20 MEQ
40 PACKET (EA) ORAL ONCE
Qty: 0 | Refills: 0 | Status: COMPLETED | OUTPATIENT
Start: 2018-02-19 | End: 2018-02-19

## 2018-02-19 RX ORDER — FUROSEMIDE 40 MG
20 TABLET ORAL
Qty: 0 | Refills: 0 | Status: DISCONTINUED | OUTPATIENT
Start: 2018-02-19 | End: 2018-02-20

## 2018-02-19 RX ORDER — DOCUSATE SODIUM 100 MG
100 CAPSULE ORAL DAILY
Qty: 0 | Refills: 0 | Status: DISCONTINUED | OUTPATIENT
Start: 2018-02-19 | End: 2018-02-20

## 2018-02-19 RX ORDER — WARFARIN SODIUM 2.5 MG/1
5 TABLET ORAL ONCE
Qty: 0 | Refills: 0 | Status: COMPLETED | OUTPATIENT
Start: 2018-02-19 | End: 2018-02-19

## 2018-02-19 RX ADMIN — Medication 100 MILLIGRAM(S): at 13:35

## 2018-02-19 RX ADMIN — VALSARTAN 80 MILLIGRAM(S): 80 TABLET ORAL at 06:50

## 2018-02-19 RX ADMIN — Medication 20 MILLIGRAM(S): at 06:50

## 2018-02-19 RX ADMIN — TAMSULOSIN HYDROCHLORIDE 0.4 MILLIGRAM(S): 0.4 CAPSULE ORAL at 21:19

## 2018-02-19 RX ADMIN — ISOSORBIDE MONONITRATE 30 MILLIGRAM(S): 60 TABLET, EXTENDED RELEASE ORAL at 09:40

## 2018-02-19 RX ADMIN — WARFARIN SODIUM 5 MILLIGRAM(S): 2.5 TABLET ORAL at 17:57

## 2018-02-19 RX ADMIN — Medication 30 MILLIGRAM(S): at 06:51

## 2018-02-19 RX ADMIN — Medication 20 MILLIGRAM(S): at 17:57

## 2018-02-19 RX ADMIN — Medication 40 MILLIEQUIVALENT(S): at 09:39

## 2018-02-19 RX ADMIN — ENOXAPARIN SODIUM 40 MILLIGRAM(S): 100 INJECTION SUBCUTANEOUS at 01:15

## 2018-02-19 RX ADMIN — Medication 81 MILLIGRAM(S): at 09:39

## 2018-02-19 RX ADMIN — Medication 4: at 13:22

## 2018-02-19 RX ADMIN — Medication 6: at 17:57

## 2018-02-19 RX ADMIN — Medication 4: at 09:39

## 2018-02-19 RX ADMIN — SENNA PLUS 2 TABLET(S): 8.6 TABLET ORAL at 21:19

## 2018-02-19 NOTE — PROGRESS NOTE ADULT - ASSESSMENT
87 y/o male with a PmHx of T2DM, HTN, CAD (stent placement in 2013), Afib (on coumadin), and recent dx of influenza A (d/c from [facility] 2/12/18) presents to ED with  RVP (+) for Influenza AH3, elevated BNP (1945) and 1 day hx of CP and SOB. CTPA reveals B/L pulmonary edema. Admit to telemetry to r/o ACS.      Problem/Plan - 1:  ·  Problem: Acute heart failure, unspecified heart failure type. Plan: Improving w/diuresis, Cardio appreciated, change to PO Lasix today, TTE  c/w ASA given h/o CAD.     Problem/Plan - 2:  ·  Problem: Afib. Plan: HR at goal, c/w Coumadin, no need for bridging     Problem/Plan - 3:  ·  Problem: Elevated alkaline phosphatase level. likely 2/2 CHF, monitor     Problem/Plan - 4:  ·  Problem: Diabetes. Plan: c/w ISS     Problem/Plan - 5:  ·  Problem: Hypertension. Plan: BP at goal 87 y/o male with a PmHx of T2DM, HTN, CAD (stent placement in 2013), Afib (on coumadin), and recent dx of influenza A (d/c from [facility] 2/12/18) presents to ED with  RVP (+) for Influenza AH3, elevated BNP (1945) and 1 day hx of CP and SOB. CTPA reveals B/L pulmonary edema. Admit to telemetry to r/o ACS.      Problem/Plan - 1:  ·  Problem: Acute heart failure, unspecified heart failure type. Plan: Improving w/diuresis, Cardio appreciated, change to PO Lasix today, TTE  c/w ASA given h/o CAD  d/c planning after TTE     Problem/Plan - 2:  ·  Problem: Afib. Plan: HR at goal, c/w Coumadin, no need for bridging     Problem/Plan - 3:  ·  Problem: Elevated alkaline phosphatase level. likely 2/2 CHF, monitor     Problem/Plan - 4:  ·  Problem: Diabetes. Plan: c/w ISS     Problem/Plan - 5:  ·  Problem: Hypertension. Plan: BP at goal

## 2018-02-19 NOTE — PROGRESS NOTE ADULT - ASSESSMENT
85 y/o male. with a PmHx of T2DM, HTN, CAD (stent placement in 2013), Afib (on coumadin), and recent dx of influenza AH3 (d/c from [facility] 2/12/18), admitted with CP and SOB    1. acute CHF   likely in the setting of viral illness/ AS   chest xray revealing bl pleural effusions   CTA chest  neg for PE , + pulm edema   check echo to eval lv fx / and aortic valve    clinically improving   continue lasix, change to PO     2. Atypical chest pain, sx resolved  pleuritic in nature, likely secondary to CHF/ viral illness   pending echo   continue ASA ( hx cad/pci)     3. Afib   chronic   rate controlled , continue CCB   CHADS 4 A/C  coumadin per inr     4. HTN   bp acceptable  continue  current antihtn meds

## 2018-02-19 NOTE — PROGRESS NOTE ADULT - SUBJECTIVE AND OBJECTIVE BOX
CHIEF COMPLAINT:Patient is a 86y old  Male who presents with a chief complaint of "CP & SOB" x 1 day (16 Feb 2018 14:40)    	  Interval history: feeling well      Allergies:  No Known Allergies      PAST MEDICAL & SURGICAL HISTORY:  CAD (coronary artery disease)  Hypertension  Hypercholesterolemia  Diabetes  History of heart artery stent: 2013      FAMILY HISTORY:  No pertinent family history in first degree relatives      REVIEW OF SYSTEMS:  CONSTITUTIONAL: No fever, weight loss, or fatigue  EYES: No eye pain, visual disturbances, or discharge  NECK: No pain or stiffness  RESPIRATORY: less cough, no wheezing, no shortness of breath  CARDIOVASCULAR: No chest pain, palpitations, dizziness, or leg swelling  GASTROINTESTINAL: No abdominal or epigastric pain. No nausea, vomiting, diarrhea or constipation  GENITOURINARY: No dysuria, urinary frequency or urgency, no hematuria  NEUROLOGICAL: No headaches, memory loss, loss of strength, numbness, or tremors  SKIN: No itching, burning, rashes, or lesions   MUSCULOSKELETAL: No joint pain or swelling; No muscle, back, or extremity pain      Medications:  MEDICATIONS  (STANDING):  aspirin enteric coated 81 milliGRAM(s) Oral daily  dextrose 5%. 1000 milliLiter(s) (50 mL/Hr) IV Continuous <Continuous>  dextrose 50% Injectable 12.5 Gram(s) IV Push once  dextrose 50% Injectable 25 Gram(s) IV Push once  dextrose 50% Injectable 25 Gram(s) IV Push once  enoxaparin Injectable 40 milliGRAM(s) SubCutaneous every 24 hours  furosemide   Injectable 20 milliGRAM(s) IV Push two times a day  influenza   Vaccine 0.5 milliLiter(s) IntraMuscular once  insulin lispro (HumaLOG) corrective regimen sliding scale   SubCutaneous three times a day before meals  insulin lispro (HumaLOG) corrective regimen sliding scale   SubCutaneous at bedtime  isosorbide   mononitrate ER Tablet (IMDUR) 30 milliGRAM(s) Oral daily  NIFEdipine XL 30 milliGRAM(s) Oral daily  potassium chloride    Tablet ER 40 milliEquivalent(s) Oral once  tamsulosin 0.4 milliGRAM(s) Oral at bedtime  valsartan 80 milliGRAM(s) Oral daily    MEDICATIONS  (PRN):  acetaminophen   Tablet 650 milliGRAM(s) Oral every 6 hours PRN For Temp greater than 38 C (100.4 F)  acetaminophen   Tablet. 650 milliGRAM(s) Oral every 6 hours PRN mild, moderate pain  dextrose Gel 1 Dose(s) Oral once PRN Blood Glucose LESS THAN 70 milliGRAM(s)/deciliter  glucagon  Injectable 1 milliGRAM(s) IntraMuscular once PRN Glucose LESS THAN 70 milligrams/deciliter    	    PHYSICAL EXAM:  T(C): 36.4 (02-19-18 @ 06:49), Max: 36.7 (02-18-18 @ 23:08)  HR: 71 (02-19-18 @ 06:49) (60 - 71)  BP: 153/74 (02-19-18 @ 06:49) (135/66 - 153/74)  RR: 18 (02-19-18 @ 06:49) (18 - 18)  SpO2: 96% (02-19-18 @ 06:49) (95% - 98%)  Wt(kg): --  I&O's Summary    18 Feb 2018 07:01  -  19 Feb 2018 07:00  --------------------------------------------------------  IN: 200 mL / OUT: 0 mL / NET: 200 mL      Appearance: Normal	  HEENT:   NCAT, PERRL, EOMI	  Lymphatic: No lymphadenopathy  Cardiovascular: Normal S1 S2, RRR  Respiratory: Lungs clear to auscultation BL  Psychiatry: A & O x 3, Mood & affect appropriate  Gastrointestinal:  Soft, Non-tender, + BS  Skin: No rashes, No ecchymoses, No cyanosis	  Neurologic: Non-focal  Extremities: Normal range of motion, No clubbing, cyanosis or edema    LABS:	 	    CARDIAC MARKERS:                                11.4   6.23  )-----------( 296      ( 19 Feb 2018 05:30 )             34.4     02-19    139  |  103  |  19  ----------------------------<  195<H>  3.6   |  22  |  1.19    Ca    8.3<L>      19 Feb 2018 05:30  Mg     2.0     02-19      proBNP:   Lipid Profile:   HgA1c:   TSH:

## 2018-02-19 NOTE — PROGRESS NOTE ADULT - SUBJECTIVE AND OBJECTIVE BOX
CARDIOLOGY FOLLOW UP NOTE - DR. TO    Subjective:  no new complaints    PHYSICAL EXAM:  T(C): 36.4 (02-19-18 @ 12:34), Max: 36.7 (02-18-18 @ 23:08)  HR: 67 (02-19-18 @ 12:34) (60 - 71)  BP: 142/69 (02-19-18 @ 12:34) (135/66 - 153/74)  RR: 17 (02-19-18 @ 12:34) (17 - 18)  SpO2: 94% (02-19-18 @ 12:34) (94% - 98%)  Wt(kg): --  I&O's Summary    18 Feb 2018 07:01  -  19 Feb 2018 07:00  --------------------------------------------------------  IN: 200 mL / OUT: 0 mL / NET: 200 mL    19 Feb 2018 07:01  -  19 Feb 2018 13:26  --------------------------------------------------------  IN: 360 mL / OUT: 0 mL / NET: 360 mL        Appearance: Normal	  Cardiovascular: Normal S1 S2,RRR, No JVD, No murmurs  Respiratory: Lungs clear to auscultation	  Gastrointestinal:  Soft, Non-tender, + BS	  Extremities: Normal range of motion, No clubbing, cyanosis or edema      MEDICATIONS  (STANDING):  aspirin enteric coated 81 milliGRAM(s) Oral daily  dextrose 5%. 1000 milliLiter(s) (50 mL/Hr) IV Continuous <Continuous>  dextrose 50% Injectable 12.5 Gram(s) IV Push once  dextrose 50% Injectable 25 Gram(s) IV Push once  dextrose 50% Injectable 25 Gram(s) IV Push once  docusate sodium 100 milliGRAM(s) Oral daily  enoxaparin Injectable 40 milliGRAM(s) SubCutaneous every 24 hours  furosemide   Injectable 20 milliGRAM(s) IV Push two times a day  influenza   Vaccine 0.5 milliLiter(s) IntraMuscular once  insulin lispro (HumaLOG) corrective regimen sliding scale   SubCutaneous three times a day before meals  insulin lispro (HumaLOG) corrective regimen sliding scale   SubCutaneous at bedtime  isosorbide   mononitrate ER Tablet (IMDUR) 30 milliGRAM(s) Oral daily  NIFEdipine XL 30 milliGRAM(s) Oral daily  senna 2 Tablet(s) Oral at bedtime  tamsulosin 0.4 milliGRAM(s) Oral at bedtime  valsartan 80 milliGRAM(s) Oral daily      TELEMETRY: 	    ECG:  	  RADIOLOGY:   DIAGNOSTIC TESTING:  [ ] Echocardiogram:  [ ] Catheterization:  [ ] Stress Test:    OTHER: 	    LABS:	 	    CARDIAC MARKERS:                                11.4   6.23  )-----------( 296      ( 19 Feb 2018 05:30 )             34.4     02-19    139  |  103  |  19  ----------------------------<  195<H>  3.6   |  22  |  1.19    Ca    8.3<L>      19 Feb 2018 05:30  Mg     2.0     02-19      proBNP:   PT/INR - ( 19 Feb 2018 05:30 )   PT: 12.2 SEC;   INR: 1.06          PTT - ( 19 Feb 2018 05:30 )  PTT:38.0 SEC  Lipid Profile:   HgA1c:

## 2018-02-20 ENCOUNTER — TRANSCRIPTION ENCOUNTER (OUTPATIENT)
Age: 83
End: 2018-02-20

## 2018-02-20 VITALS
SYSTOLIC BLOOD PRESSURE: 149 MMHG | OXYGEN SATURATION: 96 % | DIASTOLIC BLOOD PRESSURE: 76 MMHG | RESPIRATION RATE: 18 BRPM | HEART RATE: 64 BPM

## 2018-02-20 DIAGNOSIS — R69 ILLNESS, UNSPECIFIED: ICD-10-CM

## 2018-02-20 LAB
APTT BLD: 42.5 SEC — HIGH (ref 27.5–37.4)
BUN SERPL-MCNC: 21 MG/DL — SIGNIFICANT CHANGE UP (ref 7–23)
CALCIUM SERPL-MCNC: 8.6 MG/DL — SIGNIFICANT CHANGE UP (ref 8.4–10.5)
CHLORIDE SERPL-SCNC: 102 MMOL/L — SIGNIFICANT CHANGE UP (ref 98–107)
CO2 SERPL-SCNC: 21 MMOL/L — LOW (ref 22–31)
CREAT SERPL-MCNC: 1.35 MG/DL — HIGH (ref 0.5–1.3)
GLUCOSE SERPL-MCNC: 191 MG/DL — HIGH (ref 70–99)
HCT VFR BLD CALC: 37.2 % — LOW (ref 39–50)
HGB BLD-MCNC: 12.4 G/DL — LOW (ref 13–17)
INR BLD: 1.42 — HIGH (ref 0.88–1.17)
MAGNESIUM SERPL-MCNC: 2.5 MG/DL — SIGNIFICANT CHANGE UP (ref 1.6–2.6)
MCHC RBC-ENTMCNC: 29 PG — SIGNIFICANT CHANGE UP (ref 27–34)
MCHC RBC-ENTMCNC: 33.3 % — SIGNIFICANT CHANGE UP (ref 32–36)
MCV RBC AUTO: 87.1 FL — SIGNIFICANT CHANGE UP (ref 80–100)
NRBC # FLD: 0 — SIGNIFICANT CHANGE UP
PLATELET # BLD AUTO: 345 K/UL — SIGNIFICANT CHANGE UP (ref 150–400)
PMV BLD: 9.5 FL — SIGNIFICANT CHANGE UP (ref 7–13)
POTASSIUM SERPL-MCNC: 4.1 MMOL/L — SIGNIFICANT CHANGE UP (ref 3.5–5.3)
POTASSIUM SERPL-SCNC: 4.1 MMOL/L — SIGNIFICANT CHANGE UP (ref 3.5–5.3)
PROTHROM AB SERPL-ACNC: 16.5 SEC — HIGH (ref 9.8–13.1)
RBC # BLD: 4.27 M/UL — SIGNIFICANT CHANGE UP (ref 4.2–5.8)
RBC # FLD: 13.4 % — SIGNIFICANT CHANGE UP (ref 10.3–14.5)
SODIUM SERPL-SCNC: 139 MMOL/L — SIGNIFICANT CHANGE UP (ref 135–145)
WBC # BLD: 5.81 K/UL — SIGNIFICANT CHANGE UP (ref 3.8–10.5)
WBC # FLD AUTO: 5.81 K/UL — SIGNIFICANT CHANGE UP (ref 3.8–10.5)

## 2018-02-20 PROCEDURE — 93306 TTE W/DOPPLER COMPLETE: CPT | Mod: 26

## 2018-02-20 RX ORDER — WARFARIN SODIUM 2.5 MG/1
5 TABLET ORAL ONCE
Qty: 0 | Refills: 0 | Status: COMPLETED | OUTPATIENT
Start: 2018-02-20 | End: 2018-02-20

## 2018-02-20 RX ORDER — WARFARIN SODIUM 2.5 MG/1
0 TABLET ORAL
Qty: 0 | Refills: 0 | COMMUNITY

## 2018-02-20 RX ORDER — ASPIRIN/CALCIUM CARB/MAGNESIUM 324 MG
1 TABLET ORAL
Qty: 30 | Refills: 0 | OUTPATIENT
Start: 2018-02-20 | End: 2018-03-21

## 2018-02-20 RX ORDER — ISOSORBIDE MONONITRATE 60 MG/1
0 TABLET, EXTENDED RELEASE ORAL
Qty: 0 | Refills: 0 | COMMUNITY

## 2018-02-20 RX ORDER — DOCUSATE SODIUM 100 MG
1 CAPSULE ORAL
Qty: 30 | Refills: 0 | OUTPATIENT
Start: 2018-02-20 | End: 2018-03-21

## 2018-02-20 RX ORDER — VALSARTAN 80 MG/1
0 TABLET ORAL
Qty: 0 | Refills: 0 | COMMUNITY

## 2018-02-20 RX ORDER — NIFEDIPINE 30 MG
0 TABLET, EXTENDED RELEASE 24 HR ORAL
Qty: 0 | Refills: 0 | COMMUNITY

## 2018-02-20 RX ORDER — FUROSEMIDE 40 MG
20 TABLET ORAL DAILY
Qty: 0 | Refills: 0 | Status: DISCONTINUED | OUTPATIENT
Start: 2018-02-20 | End: 2018-02-20

## 2018-02-20 RX ORDER — SENNA PLUS 8.6 MG/1
2 TABLET ORAL
Qty: 60 | Refills: 0 | OUTPATIENT
Start: 2018-02-20 | End: 2018-03-21

## 2018-02-20 RX ORDER — WARFARIN SODIUM 2.5 MG/1
1 TABLET ORAL
Qty: 30 | Refills: 0 | OUTPATIENT
Start: 2018-02-20 | End: 2018-03-21

## 2018-02-20 RX ORDER — METFORMIN HYDROCHLORIDE 850 MG/1
0 TABLET ORAL
Qty: 0 | Refills: 0 | COMMUNITY

## 2018-02-20 RX ORDER — ICOSAPENT ETHYL 500 MG/1
0 CAPSULE, LIQUID FILLED ORAL
Qty: 0 | Refills: 0 | COMMUNITY

## 2018-02-20 RX ORDER — FUROSEMIDE 40 MG
1 TABLET ORAL
Qty: 30 | Refills: 0 | OUTPATIENT
Start: 2018-02-20 | End: 2018-03-21

## 2018-02-20 RX ADMIN — VALSARTAN 80 MILLIGRAM(S): 80 TABLET ORAL at 05:15

## 2018-02-20 RX ADMIN — Medication 20 MILLIGRAM(S): at 05:15

## 2018-02-20 RX ADMIN — Medication 30 MILLIGRAM(S): at 05:15

## 2018-02-20 RX ADMIN — Medication 2: at 09:13

## 2018-02-20 RX ADMIN — Medication 6: at 13:08

## 2018-02-20 RX ADMIN — Medication 100 MILLIGRAM(S): at 13:08

## 2018-02-20 RX ADMIN — WARFARIN SODIUM 5 MILLIGRAM(S): 2.5 TABLET ORAL at 17:29

## 2018-02-20 RX ADMIN — ISOSORBIDE MONONITRATE 30 MILLIGRAM(S): 60 TABLET, EXTENDED RELEASE ORAL at 13:08

## 2018-02-20 RX ADMIN — Medication 81 MILLIGRAM(S): at 13:08

## 2018-02-20 NOTE — DISCHARGE NOTE ADULT - PATIENT PORTAL LINK FT
You can access the [email or web address] Patient Portal, offered by [facility], by registering with the following website: [email or web address]

## 2018-02-20 NOTE — DISCHARGE NOTE ADULT - CARE PROVIDER_API CALL
Baudilio Ann (MD), Cardiovascular Disease; Internal Medicine; Interventional Cardiology; Nuclear Cardiology  [address]  [place] [address]  Phone: [phone]  Fax: [phone]

## 2018-02-20 NOTE — PROGRESS NOTE ADULT - SUBJECTIVE AND OBJECTIVE BOX
CARDIOLOGY FOLLOW UP - Dr. Ann    CC no chest pain or sob       PHYSICAL EXAM:  T(C): 36.6 (02-20-18 @ 05:21), Max: 36.6 (02-19-18 @ 21:18)  HR: 62 (02-20-18 @ 05:21) (62 - 67)  BP: 136/68 (02-20-18 @ 05:21) (136/68 - 142/69)  RR: 16 (02-20-18 @ 05:21) (16 - 17)  SpO2: 98% (02-20-18 @ 05:21) (94% - 98%)  Wt(kg): --  I&O's Summary    19 Feb 2018 07:01  -  20 Feb 2018 07:00  --------------------------------------------------------  IN: 920 mL / OUT: 700 mL / NET: 220 mL        Appearance: Normal	  Cardiovascular: Normal S1 S2 irregular sys murmur   Respiratory: diminished   Gastrointestinal:  Soft, Non-tender, + BS	  Extremities: Normal range of motion, No clubbing, cyanosis or edema        MEDICATIONS  (STANDING):  aspirin enteric coated 81 milliGRAM(s) Oral daily  dextrose 5%. 1000 milliLiter(s) (50 mL/Hr) IV Continuous <Continuous>  dextrose 50% Injectable 12.5 Gram(s) IV Push once  dextrose 50% Injectable 25 Gram(s) IV Push once  dextrose 50% Injectable 25 Gram(s) IV Push once  docusate sodium 100 milliGRAM(s) Oral daily  furosemide    Tablet 20 milliGRAM(s) Oral two times a day  influenza   Vaccine 0.5 milliLiter(s) IntraMuscular once  insulin lispro (HumaLOG) corrective regimen sliding scale   SubCutaneous three times a day before meals  insulin lispro (HumaLOG) corrective regimen sliding scale   SubCutaneous at bedtime  isosorbide   mononitrate ER Tablet (IMDUR) 30 milliGRAM(s) Oral daily  NIFEdipine XL 30 milliGRAM(s) Oral daily  senna 2 Tablet(s) Oral at bedtime  tamsulosin 0.4 milliGRAM(s) Oral at bedtime  valsartan 80 milliGRAM(s) Oral daily      TELEMETRY: ASfib/ aflutter HR 60s  	    ECG:  	  RADIOLOGY:   DIAGNOSTIC TESTING:  [ ] Echocardiogram:  [ ]  Catheterization:  [ ] Stress Test:    OTHER: 	    LABS:	 	                                12.4   5.81  )-----------( 345      ( 20 Feb 2018 06:00 )             37.2     02-20    139  |  102  |  21  ----------------------------<  191<H>  4.1   |  21<L>  |  1.35<H>    Ca    8.6      20 Feb 2018 06:00  Mg     2.5     02-20      PT/INR - ( 19 Feb 2018 05:30 )   PT: 12.2 SEC;   INR: 1.06          PTT - ( 19 Feb 2018 05:30 )  PTT:38.0 SEC

## 2018-02-20 NOTE — DISCHARGE NOTE ADULT - CARE PLAN
Principal Discharge DX:	Acute heart failure, unspecified heart failure type  Goal:	Prevent future exacerbations  Assessment and plan of treatment:	Continue Lasix and antihypertensives.   Follow up with your primary care doctor and cardiologist within 1-2 weeks. Call for appointment.  For any questions, please call Dr. Ann at [phone].  Secondary Diagnosis:	Afib  Assessment and plan of treatment:	Continue Coumadin. Goal INR between 2-3.   Follow up with your primary care doctor within 1-2 days to check INR levels and dose Coumadin accordingly.  Secondary Diagnosis:	Diabetes  Assessment and plan of treatment:	Continue diabetes medications. Monitor fingersticks. Consistent carbohydrate diet (avoid carbs such as pasta, bread, rice, cereal, dessert, soda, etc.)  Follow up with your primary care doctor within 1-2 weeks. Call for appointment.  Secondary Diagnosis:	Hypertension  Assessment and plan of treatment:	Continue antihypertensives. Monitor BP. DASH diet.  Follow up with your primary care doctor within 1-2 weeks. Call for appointment. Principal Discharge DX:	Acute heart failure, unspecified heart failure type  Goal:	Prevent future exacerbations  Assessment and plan of treatment:	Continue Lasix and antihypertensives.   Follow up with your primary care doctor and cardiologist within 1-2 weeks. Call for appointment.  For any questions, please call Dr. Ann at [phone].  Secondary Diagnosis:	Afib  Assessment and plan of treatment:	Continue Coumadin. Goal INR between 2-3.   Follow up with your primary care doctor within 1-2 days to check INR levels and dose Coumadin accordingly.  Secondary Diagnosis:	Diabetes  Assessment and plan of treatment:	Continue diabetes medications. Consistent carbohydrate diet (avoid carbs such as pasta, bread, rice, cereal, dessert, soda, etc.)  Follow up with your primary care doctor within 1-2 weeks. Call for appointment.  Secondary Diagnosis:	Hypertension  Assessment and plan of treatment:	Continue antihypertensives. Monitor BP. DASH diet.  Follow up with your primary care doctor within 1-2 weeks. Call for appointment.

## 2018-02-20 NOTE — PROGRESS NOTE ADULT - PROVIDER SPECIALTY LIST ADULT
Cardiology
Cardiology
Internal Medicine
Cardiology
Internal Medicine

## 2018-02-20 NOTE — DISCHARGE NOTE ADULT - HOME CARE AGENCY
[facility] - [phone] - Nurse to Visit the day after hospital discharge  [facility] - [phone]  PCA (aide) services to resume on 2/21/18.

## 2018-02-20 NOTE — DISCHARGE NOTE ADULT - ADDITIONAL INSTRUCTIONS
Follow up with your primary care doctor and cardiologist. Call for appointment.  For any questions, please call Dr. Ann at [phone].

## 2018-02-20 NOTE — PROGRESS NOTE ADULT - ASSESSMENT
85 y/o male. with a PmHx of T2DM, HTN, CAD (stent placement in 2013), Afib (on coumadin), and recent dx of influenza AH3 (d/c from [facility] 2/12/18), admitted with CP and SOB    1. acute CHF   likely in the setting of viral illness/ AS   chest xray revealing bl pleural effusions   CTA chest  neg for PE , + pulm edema   check echo to eval lv fx / and aortic valve    euvolemic   creatinine increase, hold lasix tomorrow   decrease lasix to 20 mg PO daily       2. Atypical chest pain, sx resolved  pleuritic in nature, likely secondary to CHF/ viral illness   pending echo   continue ASA ( hx cad/pci)     3. Afib   chronic   rate controlled , continue CCB   CHADS 4 A/C  coumadin per inr     4. HTN   bp acceptable  continue  current antihtn meds 87 y/o male. with a PmHx of T2DM, HTN, CAD (stent placement in 2013), Afib (on coumadin), and recent dx of influenza AH3 (d/c from [facility] 2/12/18), admitted with CP and SOB    1. acute CHF   likely in the setting of viral illness/ AS   chest xray revealing bl pleural effusions   CTA chest  neg for PE , + pulm edema   check echo to eval lv fx / and aortic valve    euvolemic   creatinine increase  decrease lasix to 20 mg PO daily       2. Atypical chest pain, sx resolved  pleuritic in nature, likely secondary to CHF/ viral illness   pending echo   continue ASA ( hx cad/pci)     3. Afib   chronic   rate controlled , continue CCB   CHADS 4 A/C  coumadin per inr     4. HTN   bp acceptable  continue  current antihtn meds

## 2018-02-20 NOTE — PROGRESS NOTE ADULT - ASSESSMENT
85 y/o male with a PmHx of T2DM, HTN, CAD (stent placement in 2013), Afib (on coumadin), and recent dx of influenza A (d/c from [facility] 2/12/18) presents to ED with  RVP (+) for Influenza AH3, elevated BNP (1945) and 1 day hx of CP and SOB. CTPA reveals B/L pulmonary edema. Admit to telemetry to r/o ACS.      Problem/Plan - 1:  ·  Problem: Acute heart failure, unspecified heart failure type. Plan: Improving w/diuresis, Cardio appreciated, c/w PO Lasix, TTE  c/w ASA given h/o CAD  d/c planning, if unable to get TTE today will do as outpt     Problem/Plan - 2:  ·  Problem: Afib. Plan: HR at goal, c/w Coumadin per INR, no need for bridging     Problem/Plan - 3:  ·  Problem: Elevated alkaline phosphatase level - monitor     Problem/Plan - 4:  ·  Problem: Diabetes. Plan: c/w ISS     Problem/Plan - 5:  ·  Problem: Hypertension. Plan: BP at goal

## 2018-02-20 NOTE — DISCHARGE NOTE ADULT - PLAN OF CARE
Prevent future exacerbations Continue Lasix and antihypertensives.   Follow up with your primary care doctor and cardiologist within 1-2 weeks. Call for appointment.  For any questions, please call Dr. Ann at [phone]. Continue Coumadin. Goal INR between 2-3.   Follow up with your primary care doctor within 1-2 days to check INR levels and dose Coumadin accordingly. Continue diabetes medications. Monitor fingersticks. Consistent carbohydrate diet (avoid carbs such as pasta, bread, rice, cereal, dessert, soda, etc.)  Follow up with your primary care doctor within 1-2 weeks. Call for appointment. Continue antihypertensives. Monitor BP. DASH diet.  Follow up with your primary care doctor within 1-2 weeks. Call for appointment. Continue diabetes medications. Consistent carbohydrate diet (avoid carbs such as pasta, bread, rice, cereal, dessert, soda, etc.)  Follow up with your primary care doctor within 1-2 weeks. Call for appointment.

## 2018-02-20 NOTE — PROGRESS NOTE ADULT - SUBJECTIVE AND OBJECTIVE BOX
CHIEF COMPLAINT:Patient is a 86y old  Male who presents with a chief complaint of "CP & SOB" x 1 day (16 Feb 2018 14:40)    	  Interval history: feeling well      Allergies:  No Known Allergies      PAST MEDICAL & SURGICAL HISTORY:  CAD (coronary artery disease)  Hypertension  Hypercholesterolemia  Diabetes  History of heart artery stent: 2013      FAMILY HISTORY:  No pertinent family history in first degree relatives      REVIEW OF SYSTEMS:  CONSTITUTIONAL: No fever, weight loss, or fatigue  EYES: No eye pain, visual disturbances, or discharge  NECK: No pain or stiffness  RESPIRATORY: less cough, no wheezing, no shortness of breath  CARDIOVASCULAR: No chest pain, palpitations, dizziness, or leg swelling  GASTROINTESTINAL: No abdominal or epigastric pain. No nausea, vomiting, diarrhea or constipation  GENITOURINARY: No dysuria, urinary frequency or urgency, no hematuria  NEUROLOGICAL: No headaches, memory loss, loss of strength, numbness, or tremors  SKIN: No itching, burning, rashes, or lesions   MUSCULOSKELETAL: No joint pain or swelling; No muscle, back, or extremity pain      Medications:  MEDICATIONS  (STANDING):  aspirin enteric coated 81 milliGRAM(s) Oral daily  dextrose 5%. 1000 milliLiter(s) (50 mL/Hr) IV Continuous <Continuous>  dextrose 50% Injectable 12.5 Gram(s) IV Push once  dextrose 50% Injectable 25 Gram(s) IV Push once  dextrose 50% Injectable 25 Gram(s) IV Push once  docusate sodium 100 milliGRAM(s) Oral daily  furosemide    Tablet 20 milliGRAM(s) Oral two times a day  influenza   Vaccine 0.5 milliLiter(s) IntraMuscular once  insulin lispro (HumaLOG) corrective regimen sliding scale   SubCutaneous three times a day before meals  insulin lispro (HumaLOG) corrective regimen sliding scale   SubCutaneous at bedtime  isosorbide   mononitrate ER Tablet (IMDUR) 30 milliGRAM(s) Oral daily  NIFEdipine XL 30 milliGRAM(s) Oral daily  senna 2 Tablet(s) Oral at bedtime  tamsulosin 0.4 milliGRAM(s) Oral at bedtime  valsartan 80 milliGRAM(s) Oral daily    MEDICATIONS  (PRN):  acetaminophen   Tablet 650 milliGRAM(s) Oral every 6 hours PRN For Temp greater than 38 C (100.4 F)  acetaminophen   Tablet. 650 milliGRAM(s) Oral every 6 hours PRN mild, moderate pain  dextrose Gel 1 Dose(s) Oral once PRN Blood Glucose LESS THAN 70 milliGRAM(s)/deciliter  glucagon  Injectable 1 milliGRAM(s) IntraMuscular once PRN Glucose LESS THAN 70 milligrams/deciliter    	    PHYSICAL EXAM:  T(C): 36.6 (02-20-18 @ 05:21), Max: 36.6 (02-19-18 @ 21:18)  HR: 62 (02-20-18 @ 05:21) (62 - 67)  BP: 136/68 (02-20-18 @ 05:21) (136/68 - 142/69)  RR: 16 (02-20-18 @ 05:21) (16 - 17)  SpO2: 98% (02-20-18 @ 05:21) (94% - 98%)  Wt(kg): --  I&O's Summary    19 Feb 2018 07:01  -  20 Feb 2018 07:00  --------------------------------------------------------  IN: 920 mL / OUT: 700 mL / NET: 220 mL      Appearance: Normal	  HEENT:   NCAT, PERRL, EOMI	  Lymphatic: No lymphadenopathy  Cardiovascular: Normal S1 S2, RRR  Respiratory: Lungs clear to auscultation BL  Psychiatry: A & O x 3, Mood & affect appropriate  Gastrointestinal:  Soft, Non-tender, + BS  Skin: No rashes, No ecchymoses, No cyanosis	  Neurologic: Non-focal  Extremities: Normal range of motion, No clubbing, cyanosis or edema    LABS:	 	    CARDIAC MARKERS:                                12.4   5.81  )-----------( 345      ( 20 Feb 2018 06:00 )             37.2     02-20    139  |  102  |  21  ----------------------------<  191<H>  4.1   |  21<L>  |  1.35<H>    Ca    8.6      20 Feb 2018 06:00  Mg     2.5     02-20      proBNP:   Lipid Profile:   HgA1c:   TSH:

## 2018-02-20 NOTE — CHART NOTE - NSCHARTNOTEFT_GEN_A_CORE
Patient was placed on isolation precautions on admission because he was diagnosed with the flu last admission on 2/12/18. Patient no longer coughing and he has been afebrile without leukocytosis. Will discontinue droplet precautions.

## 2018-02-20 NOTE — DISCHARGE NOTE ADULT - HOSPITAL COURSE
87 y/o male, with a PMHx of DM, HTN, CAD w/ 5 stents, Afib on Coumadin, s/p a diagnosis of Influenza AH3 on 2/12, presented to the ED with chest pain and shortness of breath x 1 day. He was admitted to telemetry for rule out ACS. EKG: Afib @ 76, no changes. Cardiac enzymes were negative x2. Pro-BNP: 1945. RVP positive for AH3. CXR: new small bilateral pleural effusions. Slightly hazy indistinct lung markings and hilar shadows compatible with developed mild congestion and edema. No pneumothorax. CTA chest showed no PE. Blood cultures were negative. Cardiology was following for new CHF and he received Lasix 20mg IV BID with clinical improvement and was switched to PO. Echocardiogram revealed... 85 y/o male, with a PMHx of DM, HTN, CAD w/ 5 stents, Afib on Coumadin, s/p a diagnosis of Influenza AH3 on 2/12, presented to the ED with chest pain and shortness of breath x 1 day. He was admitted to telemetry for rule out ACS. EKG: Afib @ 76, no changes. Cardiac enzymes were negative x2. Pro-BNP: 1945. RVP positive for AH3. CXR: new small bilateral pleural effusions. Slightly hazy indistinct lung markings and hilar shadows compatible with developed mild congestion and edema. No pneumothorax. CTA chest showed no PE. Blood cultures were negative. Cardiology was following for new CHF and he received Lasix 20mg IV BID with clinical improvement and was switched to PO. Echocardiogram revealed EF of 67%, normal LVSF, no segmental wall motion abnormalities.    2/20/18 Patient is stable and ready for discharge home as per Dr. Martinez and Dr. Ann.

## 2018-02-20 NOTE — PROGRESS NOTE ADULT - ATTENDING COMMENTS
agree with the above assessment and plan by KARUNA Fagan.  change lasix to 20mg PO daily  CV stable
Agree with above NP note.  acute chf  improved with diuresis  cont lasix as ordered  a/c for af  med f/u

## 2018-02-21 LAB
BACTERIA BLD CULT: SIGNIFICANT CHANGE UP
BACTERIA BLD CULT: SIGNIFICANT CHANGE UP

## 2018-07-06 NOTE — H&P ADULT - RESPIRATORY COMMENTS
Addended by: Lisa Angulo on: 7/6/2018 02:08 PM     Modules accepted: Isabel
rales noted bilaterally w/audible crackles

## 2018-12-05 ENCOUNTER — INPATIENT (INPATIENT)
Facility: HOSPITAL | Age: 83
LOS: 1 days | Discharge: HOME | DRG: 293 | End: 2018-12-07
Attending: INTERNAL MEDICINE | Admitting: INTERNAL MEDICINE
Payer: MEDICARE

## 2018-12-05 VITALS
RESPIRATION RATE: 30 BRPM | DIASTOLIC BLOOD PRESSURE: 73 MMHG | HEART RATE: 67 BPM | TEMPERATURE: 98 F | SYSTOLIC BLOOD PRESSURE: 146 MMHG | OXYGEN SATURATION: 90 % | WEIGHT: 199.96 LBS

## 2018-12-05 DIAGNOSIS — I50.9 HEART FAILURE, UNSPECIFIED: ICD-10-CM

## 2018-12-05 DIAGNOSIS — Z95.5 PRESENCE OF CORONARY ANGIOPLASTY IMPLANT AND GRAFT: Chronic | ICD-10-CM

## 2018-12-05 PROBLEM — I25.10 ATHEROSCLEROTIC HEART DISEASE OF NATIVE CORONARY ARTERY WITHOUT ANGINA PECTORIS: Chronic | Status: ACTIVE | Noted: 2018-02-12

## 2018-12-05 LAB
ALBUMIN SERPL ELPH-MCNC: 4.5 G/DL — SIGNIFICANT CHANGE UP (ref 3.3–5)
ALP SERPL-CCNC: 138 U/L — HIGH (ref 40–120)
ALT FLD-CCNC: 29 U/L — SIGNIFICANT CHANGE UP (ref 10–45)
ANION GAP SERPL CALC-SCNC: 16 MMOL/L — SIGNIFICANT CHANGE UP (ref 5–17)
APPEARANCE UR: CLEAR — SIGNIFICANT CHANGE UP
APTT BLD: 40 SEC — HIGH (ref 27.5–36.3)
AST SERPL-CCNC: 37 U/L — SIGNIFICANT CHANGE UP (ref 10–40)
BASOPHILS # BLD AUTO: 0 K/UL — SIGNIFICANT CHANGE UP (ref 0–0.2)
BASOPHILS NFR BLD AUTO: 0.2 % — SIGNIFICANT CHANGE UP (ref 0–2)
BILIRUB SERPL-MCNC: 0.9 MG/DL — SIGNIFICANT CHANGE UP (ref 0.2–1.2)
BILIRUB UR-MCNC: NEGATIVE — SIGNIFICANT CHANGE UP
BUN SERPL-MCNC: 25 MG/DL — HIGH (ref 7–23)
CALCIUM SERPL-MCNC: 9.6 MG/DL — SIGNIFICANT CHANGE UP (ref 8.4–10.5)
CHLORIDE SERPL-SCNC: 102 MMOL/L — SIGNIFICANT CHANGE UP (ref 96–108)
CO2 SERPL-SCNC: 20 MMOL/L — LOW (ref 22–31)
COLOR SPEC: COLORLESS — SIGNIFICANT CHANGE UP
CREAT SERPL-MCNC: 1.32 MG/DL — HIGH (ref 0.5–1.3)
DIFF PNL FLD: ABNORMAL
EOSINOPHIL # BLD AUTO: 0.1 K/UL — SIGNIFICANT CHANGE UP (ref 0–0.5)
EOSINOPHIL NFR BLD AUTO: 1.5 % — SIGNIFICANT CHANGE UP (ref 0–6)
GAS PNL BLDV: SIGNIFICANT CHANGE UP
GLUCOSE BLDC GLUCOMTR-MCNC: 175 MG/DL — HIGH (ref 70–99)
GLUCOSE SERPL-MCNC: 288 MG/DL — HIGH (ref 70–99)
GLUCOSE UR QL: ABNORMAL
HCT VFR BLD CALC: 40.7 % — SIGNIFICANT CHANGE UP (ref 39–50)
HGB BLD-MCNC: 14.9 G/DL — SIGNIFICANT CHANGE UP (ref 13–17)
INR BLD: 1.39 RATIO — HIGH (ref 0.88–1.16)
KETONES UR-MCNC: NEGATIVE — SIGNIFICANT CHANGE UP
LEUKOCYTE ESTERASE UR-ACNC: NEGATIVE — SIGNIFICANT CHANGE UP
LYMPHOCYTES # BLD AUTO: 1.7 K/UL — SIGNIFICANT CHANGE UP (ref 1–3.3)
LYMPHOCYTES # BLD AUTO: 20.9 % — SIGNIFICANT CHANGE UP (ref 13–44)
MCHC RBC-ENTMCNC: 31.3 PG — SIGNIFICANT CHANGE UP (ref 27–34)
MCHC RBC-ENTMCNC: 36.5 GM/DL — HIGH (ref 32–36)
MCV RBC AUTO: 85.8 FL — SIGNIFICANT CHANGE UP (ref 80–100)
MONOCYTES # BLD AUTO: 0.4 K/UL — SIGNIFICANT CHANGE UP (ref 0–0.9)
MONOCYTES NFR BLD AUTO: 5.5 % — SIGNIFICANT CHANGE UP (ref 2–14)
NEUTROPHILS # BLD AUTO: 5.8 K/UL — SIGNIFICANT CHANGE UP (ref 1.8–7.4)
NEUTROPHILS NFR BLD AUTO: 71.8 % — SIGNIFICANT CHANGE UP (ref 43–77)
NITRITE UR-MCNC: NEGATIVE — SIGNIFICANT CHANGE UP
NT-PROBNP SERPL-SCNC: 879 PG/ML — HIGH (ref 0–300)
PH UR: 6 — SIGNIFICANT CHANGE UP (ref 5–8)
PLATELET # BLD AUTO: 180 K/UL — SIGNIFICANT CHANGE UP (ref 150–400)
POTASSIUM SERPL-MCNC: 4.7 MMOL/L — SIGNIFICANT CHANGE UP (ref 3.5–5.3)
POTASSIUM SERPL-SCNC: 4.7 MMOL/L — SIGNIFICANT CHANGE UP (ref 3.5–5.3)
PROT SERPL-MCNC: 7.4 G/DL — SIGNIFICANT CHANGE UP (ref 6–8.3)
PROT UR-MCNC: ABNORMAL
PROTHROM AB SERPL-ACNC: 16 SEC — HIGH (ref 10–12.9)
RAPID RVP RESULT: SIGNIFICANT CHANGE UP
RBC # BLD: 4.75 M/UL — SIGNIFICANT CHANGE UP (ref 4.2–5.8)
RBC # FLD: 12.6 % — SIGNIFICANT CHANGE UP (ref 10.3–14.5)
SODIUM SERPL-SCNC: 138 MMOL/L — SIGNIFICANT CHANGE UP (ref 135–145)
SP GR SPEC: 1.02 — SIGNIFICANT CHANGE UP (ref 1.01–1.02)
TROPONIN T, HIGH SENSITIVITY RESULT: 15 NG/L — SIGNIFICANT CHANGE UP (ref 0–51)
TROPONIN T, HIGH SENSITIVITY RESULT: 16 NG/L — SIGNIFICANT CHANGE UP (ref 0–51)
UROBILINOGEN FLD QL: NEGATIVE — SIGNIFICANT CHANGE UP
WBC # BLD: 8.1 K/UL — SIGNIFICANT CHANGE UP (ref 3.8–10.5)
WBC # FLD AUTO: 8.1 K/UL — SIGNIFICANT CHANGE UP (ref 3.8–10.5)

## 2018-12-05 PROCEDURE — 71275 CT ANGIOGRAPHY CHEST: CPT | Mod: 26

## 2018-12-05 PROCEDURE — 93308 TTE F-UP OR LMTD: CPT | Mod: 26

## 2018-12-05 PROCEDURE — 99285 EMERGENCY DEPT VISIT HI MDM: CPT | Mod: GC

## 2018-12-05 PROCEDURE — 71045 X-RAY EXAM CHEST 1 VIEW: CPT | Mod: 26

## 2018-12-05 RX ORDER — IPRATROPIUM/ALBUTEROL SULFATE 18-103MCG
3 AEROSOL WITH ADAPTER (GRAM) INHALATION ONCE
Qty: 0 | Refills: 0 | Status: COMPLETED | OUTPATIENT
Start: 2018-12-05 | End: 2018-12-05

## 2018-12-05 RX ORDER — MIRABEGRON 50 MG/1
1 TABLET, EXTENDED RELEASE ORAL
Qty: 0 | Refills: 0 | COMMUNITY

## 2018-12-05 RX ORDER — INSULIN LISPRO 100/ML
VIAL (ML) SUBCUTANEOUS AT BEDTIME
Qty: 0 | Refills: 0 | Status: DISCONTINUED | OUTPATIENT
Start: 2018-12-05 | End: 2018-12-07

## 2018-12-05 RX ORDER — INSULIN LISPRO 100/ML
VIAL (ML) SUBCUTANEOUS
Qty: 0 | Refills: 0 | Status: DISCONTINUED | OUTPATIENT
Start: 2018-12-05 | End: 2018-12-07

## 2018-12-05 RX ORDER — DEXTROSE 50 % IN WATER 50 %
12.5 SYRINGE (ML) INTRAVENOUS ONCE
Qty: 0 | Refills: 0 | Status: DISCONTINUED | OUTPATIENT
Start: 2018-12-05 | End: 2018-12-07

## 2018-12-05 RX ORDER — ENOXAPARIN SODIUM 100 MG/ML
90 INJECTION SUBCUTANEOUS EVERY 12 HOURS
Qty: 0 | Refills: 0 | Status: DISCONTINUED | OUTPATIENT
Start: 2018-12-05 | End: 2018-12-06

## 2018-12-05 RX ORDER — DEXTROSE 50 % IN WATER 50 %
25 SYRINGE (ML) INTRAVENOUS ONCE
Qty: 0 | Refills: 0 | Status: DISCONTINUED | OUTPATIENT
Start: 2018-12-05 | End: 2018-12-07

## 2018-12-05 RX ORDER — FUROSEMIDE 40 MG
20 TABLET ORAL ONCE
Qty: 0 | Refills: 0 | Status: COMPLETED | OUTPATIENT
Start: 2018-12-05 | End: 2018-12-05

## 2018-12-05 RX ORDER — GLUCAGON INJECTION, SOLUTION 0.5 MG/.1ML
1 INJECTION, SOLUTION SUBCUTANEOUS ONCE
Qty: 0 | Refills: 0 | Status: DISCONTINUED | OUTPATIENT
Start: 2018-12-05 | End: 2018-12-07

## 2018-12-05 RX ORDER — DEXTROSE 50 % IN WATER 50 %
15 SYRINGE (ML) INTRAVENOUS ONCE
Qty: 0 | Refills: 0 | Status: DISCONTINUED | OUTPATIENT
Start: 2018-12-05 | End: 2018-12-07

## 2018-12-05 RX ORDER — SODIUM CHLORIDE 9 MG/ML
1000 INJECTION, SOLUTION INTRAVENOUS
Qty: 0 | Refills: 0 | Status: DISCONTINUED | OUTPATIENT
Start: 2018-12-05 | End: 2018-12-07

## 2018-12-05 RX ORDER — ICOSAPENT ETHYL 500 MG/1
2 CAPSULE, LIQUID FILLED ORAL
Qty: 0 | Refills: 0 | COMMUNITY

## 2018-12-05 RX ORDER — NIFEDIPINE 30 MG
60 TABLET, EXTENDED RELEASE 24 HR ORAL AT BEDTIME
Qty: 0 | Refills: 0 | Status: DISCONTINUED | OUTPATIENT
Start: 2018-12-05 | End: 2018-12-07

## 2018-12-05 RX ORDER — ACETAMINOPHEN 500 MG
650 TABLET ORAL ONCE
Qty: 0 | Refills: 0 | Status: COMPLETED | OUTPATIENT
Start: 2018-12-05 | End: 2018-12-05

## 2018-12-05 RX ADMIN — Medication 60 MILLIGRAM(S): at 22:50

## 2018-12-05 RX ADMIN — Medication 3 MILLILITER(S): at 11:30

## 2018-12-05 RX ADMIN — Medication 650 MILLIGRAM(S): at 22:24

## 2018-12-05 RX ADMIN — ENOXAPARIN SODIUM 90 MILLIGRAM(S): 100 INJECTION SUBCUTANEOUS at 23:51

## 2018-12-05 RX ADMIN — Medication 3 MILLILITER(S): at 11:49

## 2018-12-05 RX ADMIN — Medication 20 MILLIGRAM(S): at 13:15

## 2018-12-05 NOTE — ED PROVIDER NOTE - ATTENDING CONTRIBUTION TO CARE
Attending MD Ryann Aparicio:  I personally have seen and examined this patient.  Resident note reviewed and agree on plan of care and except where noted.  See HPI, PE, and MDM for details.

## 2018-12-05 NOTE — H&P ADULT - ASSESSMENT
87 y/o male h/o CHF, afib on coumadin presenting with sob. per family member pt was feeling well yesterday. this am felt okay and then became sob. no fevers. denies any chest pain. is on coumadin. no productive cough. no pain with inspiration. no sick contacts. no n/v/d. no reports of weight gain. no LE swelling. no back pain. per wife noticed was breathing faster this am. also reports left knee pain. no trauma.     acute on chronic diastolic CHF  - cont lasix  - strict I's and O's    cad  - c/w asa  - c/w imdur    afib  - c/w coumadin  - lovenox until inr is theraputic    HTN  - cw nifedipine and diovan    diabetes  - fs qid  - hgb a1c  - insulin ss    bph  - cw flomax    pt eval

## 2018-12-05 NOTE — ED PROVIDER NOTE - PROGRESS NOTE DETAILS
Attending Ryann Aparicio: pt appears more comfortable. no rectal temp. cxr shows likelly interstitial edema. duplex pending.

## 2018-12-05 NOTE — ED ADULT NURSE NOTE - OBJECTIVE STATEMENT
87 y/o M pt w/ pmh of CHF, HTN, DM, present to ED for SOB, as EMS pt was called for left leg pain, found to be SOB on arrival, sating 94% on ra for EMS, here in ED sating 90% on ra, placed on 2L O2 via nasal cannula now sating at 96%, pt report right knee pain, lungs b/l CTA, denies chest pain, fever, N/V/D, seen and eval by MD, placed on CM NSR, heplock placed, labs drawn and sent

## 2018-12-05 NOTE — H&P ADULT - HISTORY OF PRESENT ILLNESS
85 y/o male h/o CHF, afib on coumadin presenting with sob. per family member pt was feeling well yesterday. this am felt okay and then became sob. no fevers. denies any chest pain. is on coumadin. no productive cough. no pain with inspiration. no sick contacts. no n/v/d. no reports of weight gain. no LE swelling. no back pain. per wife noticed was breathing faster this am. also reports left knee pain. no trauma.

## 2018-12-05 NOTE — ED PROVIDER NOTE - MEDICAL DECISION MAKING DETAILS
86M hx CHF with dyspnea, hypoxia (new for him), LE swelling - no apparent infectious etiology, embolic disease ruled out, seems to be CHF exacerbation. Will kaur, admit. 86M hx CHF with dyspnea, hypoxia (new for him), LE swelling - no apparent infectious etiology, embolic disease ruled out, seems to be CHF exacerbation. Will kaur, admit.  Attending Ryann Aparicio: 85 y/o male h/o CHF presenting with lower leg pain and sob. upon arrival pt tachypnic requiring supplemental oxygen. pocus shows trace B lines. pt with h/o CHF. no evidnece of PE. duplex ordered. pt will need admission for likely diuresis. will also send rvp

## 2018-12-05 NOTE — ED ADULT NURSE REASSESSMENT NOTE - NS ED NURSE REASSESS COMMENT FT1
Pt received bed assignment. Pt aware. Report given to RN. VSS. Pt stable for transport. Chart given to charge desk. Will cont to monitor.

## 2018-12-05 NOTE — ED PROVIDER NOTE - OBJECTIVE STATEMENT
Attending Ryann Aparicio: 87 y/o male h/o CHF, afib on coumadin presenting with sob. per family member pt was feeling well yesterday. this am felt okay and then became sob. no fevers. denies any chest pain. is on coumadin. no productive cough. no pain with inspiration. no sick contacts. no n/v/d. no reports of weight gain. no LE swelling. no back pain. per wife noticed was breathing faster this am Attending Ryann Aparicio: 87 y/o male h/o CHF, afib on coumadin presenting with sob. per family member pt was feeling well yesterday. this am felt okay and then became sob. no fevers. denies any chest pain. is on coumadin. no productive cough. no pain with inspiration. no sick contacts. no n/v/d. no reports of weight gain. no LE swelling. no back pain. per wife noticed was breathing faster this am. also reports left knee pain. no trauma. able to range

## 2018-12-05 NOTE — ED ADULT NURSE REASSESSMENT NOTE - NS ED NURSE REASSESS COMMENT FT1
Received report from previous shift RN. Patient resting comfortably in bed, reporting improvement in breathing since NC applied. Patient reporting tolerable L knee pain present x weeks, but worsening this morning prior to arrival. Patient denies current CP, palpitations, abd pain, n/v/d. Patient primarily Gabonese speaking with son at bedside to assist in translation, declining translation services currently. VSS. RVP sent to lab, patient and family aware of plan of care. VSS

## 2018-12-05 NOTE — ED PROVIDER NOTE - PHYSICAL EXAMINATION
Attending Ryann Aparicio: Gen: tachypnic, heent: atrauamtic, eomi, perrla, mmm, op pink, uvula midline, neck; nttp, no nuchal rigidity, chest: nttp, no crepitus, cv: rrr, no murmurs, lungs: decreased breath sounds at bases with scattered rhonchi and wheeze abd: soft, nontender, nondistended, no peritoneal signs, +BS, no guarding, ext: wwp, neg homans, skin: no rash, neuro: awake and alert, following commands, speech clear, sensation and strength intact, no focal deficits

## 2018-12-05 NOTE — H&P ADULT - NSHPLABSRESULTS_GEN_ALL_CORE
LABS:                        14.9   8.1   )-----------( 180      ( 05 Dec 2018 11:14 )             40.7     12    138  |  102  |  25<H>  ----------------------------<  288<H>  4.7   |  20<L>  |  1.32<H>    Ca    9.6      05 Dec 2018 11:14    TPro  7.4  /  Alb  4.5  /  TBili  0.9  /  DBili  x   /  AST  37  /  ALT  29  /  AlkPhos  138<H>  12-    PT/INR - ( 05 Dec 2018 11:14 )   PT: 16.0 sec;   INR: 1.39 ratio         PTT - ( 05 Dec 2018 11:14 )  PTT:40.0 sec  Urinalysis Basic - ( 05 Dec 2018 16:24 )    Color: Colorless / Appearance: Clear / S.019 / pH: x  Gluc: x / Ketone: Negative  / Bili: Negative / Urobili: Negative   Blood: x / Protein: 100 mg/dL / Nitrite: Negative   Leuk Esterase: Negative / RBC: 2 /hpf / WBC 0 /hpf   Sq Epi: x / Non Sq Epi: 0 /hpf / Bacteria: Negative            RADIOLOGY & ADDITIONAL TESTS:

## 2018-12-06 LAB
ANION GAP SERPL CALC-SCNC: 16 MMOL/L — SIGNIFICANT CHANGE UP (ref 5–17)
APTT BLD: 41.5 SEC — HIGH (ref 27.5–36.3)
BUN SERPL-MCNC: 21 MG/DL — SIGNIFICANT CHANGE UP (ref 7–23)
CALCIUM SERPL-MCNC: 9.3 MG/DL — SIGNIFICANT CHANGE UP (ref 8.4–10.5)
CHLORIDE SERPL-SCNC: 103 MMOL/L — SIGNIFICANT CHANGE UP (ref 96–108)
CO2 SERPL-SCNC: 21 MMOL/L — LOW (ref 22–31)
CREAT SERPL-MCNC: 1.34 MG/DL — HIGH (ref 0.5–1.3)
FOLATE SERPL-MCNC: 10.5 NG/ML — SIGNIFICANT CHANGE UP
GLUCOSE BLDC GLUCOMTR-MCNC: 138 MG/DL — HIGH (ref 70–99)
GLUCOSE BLDC GLUCOMTR-MCNC: 159 MG/DL — HIGH (ref 70–99)
GLUCOSE BLDC GLUCOMTR-MCNC: 161 MG/DL — HIGH (ref 70–99)
GLUCOSE BLDC GLUCOMTR-MCNC: 185 MG/DL — HIGH (ref 70–99)
GLUCOSE BLDC GLUCOMTR-MCNC: 229 MG/DL — HIGH (ref 70–99)
GLUCOSE SERPL-MCNC: 149 MG/DL — HIGH (ref 70–99)
HBA1C BLD-MCNC: 7.3 % — HIGH (ref 4–5.6)
HCT VFR BLD CALC: 40 % — SIGNIFICANT CHANGE UP (ref 39–50)
HGB BLD-MCNC: 14 G/DL — SIGNIFICANT CHANGE UP (ref 13–17)
MAGNESIUM SERPL-MCNC: 2 MG/DL — SIGNIFICANT CHANGE UP (ref 1.6–2.6)
MCHC RBC-ENTMCNC: 29.3 PG — SIGNIFICANT CHANGE UP (ref 27–34)
MCHC RBC-ENTMCNC: 35 GM/DL — SIGNIFICANT CHANGE UP (ref 32–36)
MCV RBC AUTO: 83.7 FL — SIGNIFICANT CHANGE UP (ref 80–100)
PHOSPHATE SERPL-MCNC: 3.3 MG/DL — SIGNIFICANT CHANGE UP (ref 2.5–4.5)
PLATELET # BLD AUTO: 201 K/UL — SIGNIFICANT CHANGE UP (ref 150–400)
POTASSIUM SERPL-MCNC: 3.7 MMOL/L — SIGNIFICANT CHANGE UP (ref 3.5–5.3)
POTASSIUM SERPL-SCNC: 3.7 MMOL/L — SIGNIFICANT CHANGE UP (ref 3.5–5.3)
RBC # BLD: 4.78 M/UL — SIGNIFICANT CHANGE UP (ref 4.2–5.8)
RBC # FLD: 13.6 % — SIGNIFICANT CHANGE UP (ref 10.3–14.5)
SODIUM SERPL-SCNC: 140 MMOL/L — SIGNIFICANT CHANGE UP (ref 135–145)
TSH SERPL-MCNC: 2.92 UIU/ML — SIGNIFICANT CHANGE UP (ref 0.27–4.2)
VIT B12 SERPL-MCNC: 209 PG/ML — LOW (ref 232–1245)
WBC # BLD: 7.27 K/UL — SIGNIFICANT CHANGE UP (ref 3.8–10.5)
WBC # FLD AUTO: 7.27 K/UL — SIGNIFICANT CHANGE UP (ref 3.8–10.5)

## 2018-12-06 RX ORDER — TAMSULOSIN HYDROCHLORIDE 0.4 MG/1
0.4 CAPSULE ORAL AT BEDTIME
Qty: 0 | Refills: 0 | Status: DISCONTINUED | OUTPATIENT
Start: 2018-12-06 | End: 2018-12-07

## 2018-12-06 RX ORDER — OMEGA-3 ACID ETHYL ESTERS 1 G
1 CAPSULE ORAL
Qty: 0 | Refills: 0 | COMMUNITY

## 2018-12-06 RX ORDER — FEBUXOSTAT 40 MG/1
1 TABLET ORAL
Qty: 0 | Refills: 0 | COMMUNITY

## 2018-12-06 RX ORDER — ACETAMINOPHEN 500 MG
650 TABLET ORAL THREE TIMES A DAY
Qty: 0 | Refills: 0 | Status: DISCONTINUED | OUTPATIENT
Start: 2018-12-06 | End: 2018-12-07

## 2018-12-06 RX ORDER — MIRABEGRON 50 MG/1
1 TABLET, EXTENDED RELEASE ORAL
Qty: 0 | Refills: 0 | COMMUNITY

## 2018-12-06 RX ORDER — DICLOFENAC SODIUM 30 MG/G
1 GEL TOPICAL
Qty: 0 | Refills: 0 | COMMUNITY

## 2018-12-06 RX ORDER — FINASTERIDE 5 MG/1
5 TABLET, FILM COATED ORAL DAILY
Qty: 0 | Refills: 0 | Status: DISCONTINUED | OUTPATIENT
Start: 2018-12-06 | End: 2018-12-07

## 2018-12-06 RX ORDER — FUROSEMIDE 40 MG
20 TABLET ORAL DAILY
Qty: 0 | Refills: 0 | Status: DISCONTINUED | OUTPATIENT
Start: 2018-12-06 | End: 2018-12-07

## 2018-12-06 RX ORDER — ALBUTEROL 90 UG/1
2 AEROSOL, METERED ORAL EVERY 6 HOURS
Qty: 0 | Refills: 0 | Status: DISCONTINUED | OUTPATIENT
Start: 2018-12-06 | End: 2018-12-07

## 2018-12-06 RX ORDER — RIVAROXABAN 15 MG-20MG
20 KIT ORAL EVERY 24 HOURS
Qty: 0 | Refills: 0 | Status: DISCONTINUED | OUTPATIENT
Start: 2018-12-06 | End: 2018-12-06

## 2018-12-06 RX ORDER — TAMSULOSIN HYDROCHLORIDE 0.4 MG/1
1 CAPSULE ORAL
Qty: 0 | Refills: 0 | COMMUNITY

## 2018-12-06 RX ORDER — FEBUXOSTAT 40 MG/1
40 TABLET ORAL DAILY
Qty: 0 | Refills: 0 | Status: DISCONTINUED | OUTPATIENT
Start: 2018-12-06 | End: 2018-12-07

## 2018-12-06 RX ORDER — INFLUENZA VIRUS VACCINE 15; 15; 15; 15 UG/.5ML; UG/.5ML; UG/.5ML; UG/.5ML
0.5 SUSPENSION INTRAMUSCULAR ONCE
Qty: 0 | Refills: 0 | Status: COMPLETED | OUTPATIENT
Start: 2018-12-06 | End: 2018-12-07

## 2018-12-06 RX ORDER — ISOSORBIDE MONONITRATE 60 MG/1
1 TABLET, EXTENDED RELEASE ORAL
Qty: 0 | Refills: 0 | COMMUNITY

## 2018-12-06 RX ORDER — ROSUVASTATIN CALCIUM 5 MG/1
1 TABLET ORAL
Qty: 0 | Refills: 0 | COMMUNITY

## 2018-12-06 RX ORDER — DUTASTERIDE AND TAMSULOSIN HYDROCHLORIDE CAPSULES .5; .4 MG/1; MG/1
1 CAPSULE ORAL
Qty: 0 | Refills: 0 | COMMUNITY

## 2018-12-06 RX ORDER — ERGOCALCIFEROL 1.25 MG/1
50000 CAPSULE ORAL
Qty: 0 | Refills: 0 | Status: DISCONTINUED | OUTPATIENT
Start: 2018-12-06 | End: 2018-12-07

## 2018-12-06 RX ORDER — METFORMIN HYDROCHLORIDE 850 MG/1
1 TABLET ORAL
Qty: 0 | Refills: 0 | COMMUNITY

## 2018-12-06 RX ORDER — LOSARTAN POTASSIUM 100 MG/1
100 TABLET, FILM COATED ORAL DAILY
Qty: 0 | Refills: 0 | Status: DISCONTINUED | OUTPATIENT
Start: 2018-12-06 | End: 2018-12-07

## 2018-12-06 RX ORDER — RIVAROXABAN 15 MG-20MG
15 KIT ORAL EVERY 24 HOURS
Qty: 0 | Refills: 0 | Status: DISCONTINUED | OUTPATIENT
Start: 2018-12-06 | End: 2018-12-07

## 2018-12-06 RX ORDER — VALSARTAN 80 MG/1
1 TABLET ORAL
Qty: 0 | Refills: 0 | COMMUNITY

## 2018-12-06 RX ORDER — ISOSORBIDE MONONITRATE 60 MG/1
30 TABLET, EXTENDED RELEASE ORAL DAILY
Qty: 0 | Refills: 0 | Status: DISCONTINUED | OUTPATIENT
Start: 2018-12-06 | End: 2018-12-07

## 2018-12-06 RX ORDER — NIFEDIPINE 30 MG
1 TABLET, EXTENDED RELEASE 24 HR ORAL
Qty: 0 | Refills: 0 | COMMUNITY

## 2018-12-06 RX ORDER — FERROUS SULFATE 325(65) MG
325 TABLET ORAL DAILY
Qty: 0 | Refills: 0 | Status: DISCONTINUED | OUTPATIENT
Start: 2018-12-06 | End: 2018-12-07

## 2018-12-06 RX ORDER — MIRABEGRON 50 MG/1
25 TABLET, EXTENDED RELEASE ORAL DAILY
Qty: 0 | Refills: 0 | Status: DISCONTINUED | OUTPATIENT
Start: 2018-12-06 | End: 2018-12-07

## 2018-12-06 RX ORDER — FERROUS SULFATE 325(65) MG
1 TABLET ORAL
Qty: 0 | Refills: 0 | COMMUNITY

## 2018-12-06 RX ADMIN — LOSARTAN POTASSIUM 100 MILLIGRAM(S): 100 TABLET, FILM COATED ORAL at 05:43

## 2018-12-06 RX ADMIN — Medication 1: at 08:19

## 2018-12-06 RX ADMIN — Medication 650 MILLIGRAM(S): at 18:35

## 2018-12-06 RX ADMIN — Medication 2: at 17:16

## 2018-12-06 RX ADMIN — Medication 650 MILLIGRAM(S): at 21:47

## 2018-12-06 RX ADMIN — Medication 650 MILLIGRAM(S): at 21:45

## 2018-12-06 RX ADMIN — Medication 650 MILLIGRAM(S): at 17:17

## 2018-12-06 RX ADMIN — RIVAROXABAN 15 MILLIGRAM(S): KIT at 17:17

## 2018-12-06 RX ADMIN — TAMSULOSIN HYDROCHLORIDE 0.4 MILLIGRAM(S): 0.4 CAPSULE ORAL at 21:44

## 2018-12-06 RX ADMIN — Medication 1: at 12:34

## 2018-12-06 RX ADMIN — Medication 60 MILLIGRAM(S): at 21:45

## 2018-12-06 RX ADMIN — Medication 20 MILLIGRAM(S): at 05:42

## 2018-12-06 RX ADMIN — ISOSORBIDE MONONITRATE 30 MILLIGRAM(S): 60 TABLET, EXTENDED RELEASE ORAL at 11:40

## 2018-12-06 NOTE — PHYSICAL THERAPY INITIAL EVALUATION ADULT - GAIT DEVIATIONS NOTED, PT EVAL
decreased arden/decreased step length/increased time in double stance/decreased weight-shifting ability

## 2018-12-06 NOTE — CONSULT NOTE ADULT - ASSESSMENT
Ill characterized LLE pain - atraumatic, with normal exam and without edema  ACute on chronic diastolic CHF due to moderate MR/AS  Bibasilar atelectasis on motion limited CTA: PE to subsegm level excluded    REC    CHF management per primary team: diuretics prn  LE dopplers (INR on admission sub-therapeutic)  Check RA sats ambulating: no need for oxygen at rest at this time. Borderline sats likely due to basilar atelectasis and obesity

## 2018-12-06 NOTE — PHYSICAL THERAPY INITIAL EVALUATION ADULT - DISCHARGE DISPOSITION, PT EVAL
rehabilitation facility/Subacute rehab. **Pt and pt's son declining PT recommendation, prefer d/c home; if pt to return home, recommend home PT for gait, balance, & strength training and to return pt to baseline functional mobility status. Rolling walker with ambulation (pt owns rollator). Also recommend transport chair, commode. Assist with all mobility skills at this time (pt has [facility] 7 days/week, 8 hrs/day, pt's son states family will assist pt at all other times).

## 2018-12-06 NOTE — CONSULT NOTE ADULT - SUBJECTIVE AND OBJECTIVE BOX
PULMONARY CONSULT  Dong Sanford MD  [phone]    Initial HPI on admission:  HPI:  87 y/o male h/o CHF, afib on coumadin presenting with sob. per family member pt was feeling well yesterday. this am felt okay and then became sob. no fevers. denies any chest pain. is on coumadin. no productive cough. no pain with inspiration. no sick contacts. no n/v/d. no reports of weight gain. no LE swelling. no back pain. per wife noticed was breathing faster this am. also reports left knee pain. no trauma. (05 Dec 2018 18:32)    Patient currently denies dyspnea: states primary complaint is LLE pain, both prox and distal  He denies fever, chills, URI, CP    PAST MEDICAL & SURGICAL HISTORY:  CAD (coronary artery disease)  Hypertension  Hypercholesterolemia  Diabetes  History of heart artery stent:     Allergies    No Known Allergies    Intolerances      FAMILY HISTORY:  No pertinent family history in first degree relatives    Social history: Son present; non smoker; lives with HHA; ambulates with walker    Medications:  MEDICATIONS  (STANDING):  acetaminophen   Tablet .. 650 milliGRAM(s) Oral three times a day  dextrose 5%. 1000 milliLiter(s) (50 mL/Hr) IV Continuous <Continuous>  dextrose 50% Injectable 12.5 Gram(s) IV Push once  dextrose 50% Injectable 25 Gram(s) IV Push once  dextrose 50% Injectable 25 Gram(s) IV Push once  furosemide   Injectable 20 milliGRAM(s) IV Push daily  influenza   Vaccine 0.5 milliLiter(s) IntraMuscular once  insulin lispro (HumaLOG) corrective regimen sliding scale   SubCutaneous three times a day before meals  insulin lispro (HumaLOG) corrective regimen sliding scale   SubCutaneous at bedtime  isosorbide   mononitrate ER Tablet (IMDUR) 30 milliGRAM(s) Oral daily  losartan 100 milliGRAM(s) Oral daily  NIFEdipine XL 60 milliGRAM(s) Oral at bedtime  rivaroxaban 15 milliGRAM(s) Oral every 24 hours  tamsulosin 0.4 milliGRAM(s) Oral at bedtime    MEDICATIONS  (PRN):  dextrose 40% Gel 15 Gram(s) Oral once PRN Blood Glucose LESS THAN 70 milliGRAM(s)/deciliter  glucagon  Injectable 1 milliGRAM(s) IntraMuscular once PRN Glucose LESS THAN 70 milligrams/deciliter    Vital Signs Last 24 Hrs  T(C): 36.6 (06 Dec 2018 11:26), Max: 36.8 (05 Dec 2018 18:31)  T(F): 97.8 (06 Dec 2018 11:), Max: 98.3 (05 Dec 2018 18:31)  HR: 48 (06 Dec 2018 11:) (48 - 71)  BP: 152/62 (06 Dec 2018 11:) (151/79 - 191/74)  BP(mean): --  RR: 17 (06 Dec 2018 11:) (17 - 22)  SpO2: 96% (06 Dec 2018 11:) (90% - 98%)           @ 07:01  -  12 @ 07:00  --------------------------------------------------------  IN: 0 mL / OUT: 1200 mL / NET: -1200 mL      LABS:                        14.0   7.27  )-----------( 201      ( 06 Dec 2018 09:09 )             40.0     12-06    140  |  103  |  21  ----------------------------<  149<H>  3.7   |  21<L>  |  1.34<H>    Ca    9.3      06 Dec 2018 07:16  Phos  3.3     12-06  Mg     2.0     12-    TPro  7.4  /  Alb  4.5  /  TBili  0.9  /  DBili  x   /  AST  37  /  ALT  29  /  AlkPhos  138<H>  12      PT/INR - ( 05 Dec 2018 11:14 )   PT: 16.0 sec;   INR: 1.39 ratio         PTT - ( 06 Dec 2018 09:09 )  PTT:41.5 sec  Urinalysis Basic - ( 05 Dec 2018 16:24 )    Color: Colorless / Appearance: Clear / S.019 / pH: x  Gluc: x / Ketone: Negative  / Bili: Negative / Urobili: Negative   Blood: x / Protein: 100 mg/dL / Nitrite: Negative   Leuk Esterase: Negative / RBC: 2 /hpf / WBC 0 /hpf   Sq Epi: x / Non Sq Epi: 0 /hpf / Bacteria: Negative        Serum Pro-Brain Natriuretic Peptide: 879 pg/mL (18 @ 11:14)      CULTURES:        Physical Examination:    General: Non toxic, No acute distress.  O2 sat 91% on RA at rest    HEENT: Pupils equal, reactive to light.  Symmetric.    PULM: R>L bibasilar crackles    CVS: Regular rate and rhythm, no murmurs, rubs, or gallops    ABD: Soft, nondistended, nontender, normoactive bowel sounds, no masses    EXT: No edema, nontender    SKIN: Warm and well perfused, no rashes noted.    NEURO: Alert, oriented, interactive, nonfocal    RADIOLOGY REVIEWED PERSONALLY  CXR:    CT chest:    NTERPRETATION:  CT CHEST WITH CONTRAST    INDICATION: Shortness of breath. Evaluate for pulmonary embolism.    TECHNIQUE: Enhanced helical images wereobtained of the chest. Coronal   and sagittal images were reconstructed. Maximum intensity projection   images were generated. Images were obtained after the uneventful   administration of 90 cc nonionic intravenous Omnipaque 350.  10 cc of   Omnipaque 350 was discarded.    COMPARISON: CT chest 2018.    FINDINGS:     Tubes/Lines: None.    Lungs And Airways: Bibasilar subsegmental atelectasis.      The airways are unremarkable.      Pleura: No pneumothorax.  No pleural effusion.    Mediastinum: There are enlarged right paratracheal, subcarinal, and right   perihilar lymph nodes, findings that are unchanged since 2018. The   visualized portion of the thyroid gland is unremarkable.       Heart:  The heart is enlarged.  There is no pericardial effusion.   Coronary artery disease with calcified plaque involving the left coronary   artery and left anterior descending artery. Aortic valve calcification.    Pulmonary artery: The main pulmonary artery is normal in caliber. There   is no main, central, lobar, or proximal to mid segmental pulmonary   embolus. The distal segment and subsegmental vessels are not well   evaluated.    Aorta. Left-sided aortic arch and left-sided descending thoracic aorta. .   No thoracic aortic aneurysm ordissection. Atheromatous disease of the   aorta.    Upper Abdomen: Cholecystectomy. Right renal cyst.    Bones And Soft Tissues: Degenerative spine changes.  The soft tissues are   unremarkable.      IMPRESSION:     1.  No pulmonary embolism.  2.  Bibasilar subsegmental atelectasis.  3.  No change in the chest lymph nodes which are of uncertain etiology   and significance.  TTE:    Patient name: TOMAS MOJICA  YOB: 1932   Age: 86 (M)   MR#: [id number]  Study Date: 2018  [facility]  Sonographer: Lauren Finkelstein  Study quality: Technically Fair  Referring Physician: Gabby Law MD  Blood Pressure: 143/75 mmHg  Height: 170 cm  Weight: 83 kg  BSA: 2 m2  ------------------------------------------------------------------------  PROCEDURE: Transthoracic echocardiogram with 2-D, M-Mode  and complete spectral and color flow Doppler.  INDICATION: Chest pain, unspecified (R07.9)  ------------------------------------------------------------------------  DIMENSIONS:  Dimensions:     Normal Values:  LA:     4.1 cm    2.0 - 4.0 cm  Ao:     3.1 cm    2.0 - 3.8 cm  SEPTUM: 0.9 cm    0.6 - 1.2 cm  PWT:    0.9 cm    0.6 - 1.1 cm  LVIDd:  4.8 cm    3.0 - 5.6 cm  LVIDs:  3.0 cm    1.8 - 4.0 cm  Derived Variables:  LVMI: 75 g/m2  RWT: 0.37  Fractional short: 38 %  Ejection Fraction (Teicholtz): 67 %  ------------------------------------------------------------------------  OBSERVATIONS:  Mitral Valve: Mitral annular calcification, otherwise  normal mitral valve. Mild-moderate mitral regurgitation.  Aortic Root: Normal aortic root.  Aortic Valve: Aortic valve leaflet morphology not well  visualized.  Thevalve is heavily calcified. Peak  transaortic valve gradient equals 46 mm Hg, mean  transaortic valve gradient equals 24 mm Hg, consistent with  at least moderate aortic stenosis.  However, unable to  accurately estimate the aortic valve area. Minimal aortic  regurgitation.  Left Atrium: Mildly dilated left atrium.  LA volume index =  38 cc/m2.  Left Ventricle: Normal left ventricular systolic function.  No segmental wall motion abnormalities. Normal left  ventricular internal dimensions and wallthicknesses.  Right Heart: Normal right atrium. Normal right ventricular  size and function. Normal tricuspid valve. Mild tricuspid  regurgitation. Normal pulmonic valve.  Minimal pulmonic  regurgitation.  Pericardium/PleuraNormal pericardium with nopericardial  effusion.  ------------------------------------------------------------------------  CONCLUSIONS:  1. Mitral annular calcification, otherwise normal mitral  valve. Mild-moderate mitral regurgitation.  2. Aortic valve leaflet morphology not well visualized.  The valve is heavily calcified. Peak transaortic valve  gradient equals 46 mm Hg, mean transaortic valve gradient  equals 24 mm Hg, consistent with at least moderate aortic  stenosis.  However, unable to accurately estimate the  aortic valve area. Minimal aortic regurgitation.  3. Mildly dilated left atrium.  LA volume index = 38 cc/m2.  4. Normal left ventricular internal dimensions and wall  thicknesses.  5. Normal left ventricular systolic function. No segmental  wall motion abnormalities.  6. Normal right ventricular size and function.  *** Compared with echocardiogram of 2010, the  transaortic gradients have increased.  Consider JARET for  further evaluation of the aortic valve, if clinically  indicated.      Assessment:    Plan:

## 2018-12-06 NOTE — PHYSICAL THERAPY INITIAL EVALUATION ADULT - STRENGTHENING, PT EVAL
GOAL: Pt will improve L LE strength by 1/2 grade to improve level of independence with mobility skills and ADLs in 2 weeks.

## 2018-12-06 NOTE — PHYSICAL THERAPY INITIAL EVALUATION ADULT - GENERAL OBSERVATIONS, REHAB EVAL
Pt received semi-supine in bed, son at side, (+) telemetry, NAD. Pt alert, Iraqi-speaking, agreeable to PT eval.

## 2018-12-06 NOTE — PHYSICAL THERAPY INITIAL EVALUATION ADULT - ADDITIONAL COMMENTS
Pt lives alone in 4th floor apartment, no stairs to enter, (+) elevator access. Pt has HHA 7 days/week, 8 hrs/day who assists pt with ADLs. Pt's son reports he will be available to provide pt with assist upon d/c home. Pt owns rollator, shower chair.

## 2018-12-06 NOTE — PROGRESS NOTE ADULT - ASSESSMENT
87 y/o male h/o CHF, afib on coumadin presenting with sob. per family member pt was feeling well yesterday. this am felt okay and then became sob. no fevers. denies any chest pain. is on coumadin. no productive cough. no pain with inspiration. no sick contacts. no n/v/d. no reports of weight gain. no LE swelling. no back pain. per wife noticed was breathing faster this am. also reports left knee pain. no trauma.     acute on chronic diastolic CHF  - cont lasix  - strict I's and O's    cad  - c/w asa  - c/w imdur    afib  - c/w xarelto    HTN  - cw nifedipine and diovan    diabetes  - fs qid  - hgb a1c  - insulin ss    bph  - cw flomax    deejay lwer ext pain likely secondary to OA  - tylenol bid atc    pt eval

## 2018-12-06 NOTE — PHYSICAL THERAPY INITIAL EVALUATION ADULT - PERTINENT HX OF CURRENT PROBLEM, REHAB EVAL
85 y/o male h/o CHF, afib on coumadin presenting with sob. per family member pt was feeling well yesterday. this am felt okay and then became sob.

## 2018-12-07 ENCOUNTER — TRANSCRIPTION ENCOUNTER (OUTPATIENT)
Age: 83
End: 2018-12-07

## 2018-12-07 VITALS
SYSTOLIC BLOOD PRESSURE: 152 MMHG | DIASTOLIC BLOOD PRESSURE: 65 MMHG | OXYGEN SATURATION: 94 % | HEART RATE: 66 BPM | RESPIRATION RATE: 18 BRPM | TEMPERATURE: 98 F

## 2018-12-07 LAB
ANION GAP SERPL CALC-SCNC: 13 MMOL/L — SIGNIFICANT CHANGE UP (ref 5–17)
BUN SERPL-MCNC: 28 MG/DL — HIGH (ref 7–23)
CALCIUM SERPL-MCNC: 9.2 MG/DL — SIGNIFICANT CHANGE UP (ref 8.4–10.5)
CHLORIDE SERPL-SCNC: 105 MMOL/L — SIGNIFICANT CHANGE UP (ref 96–108)
CO2 SERPL-SCNC: 23 MMOL/L — SIGNIFICANT CHANGE UP (ref 22–31)
CREAT SERPL-MCNC: 1.47 MG/DL — HIGH (ref 0.5–1.3)
GLUCOSE BLDC GLUCOMTR-MCNC: 205 MG/DL — HIGH (ref 70–99)
GLUCOSE BLDC GLUCOMTR-MCNC: 217 MG/DL — HIGH (ref 70–99)
GLUCOSE BLDC GLUCOMTR-MCNC: 226 MG/DL — HIGH (ref 70–99)
GLUCOSE SERPL-MCNC: 179 MG/DL — HIGH (ref 70–99)
HCT VFR BLD CALC: 40.6 % — SIGNIFICANT CHANGE UP (ref 39–50)
HGB BLD-MCNC: 13.8 G/DL — SIGNIFICANT CHANGE UP (ref 13–17)
MCHC RBC-ENTMCNC: 29.6 PG — SIGNIFICANT CHANGE UP (ref 27–34)
MCHC RBC-ENTMCNC: 34 GM/DL — SIGNIFICANT CHANGE UP (ref 32–36)
MCV RBC AUTO: 86.9 FL — SIGNIFICANT CHANGE UP (ref 80–100)
PLATELET # BLD AUTO: 200 K/UL — SIGNIFICANT CHANGE UP (ref 150–400)
POTASSIUM SERPL-MCNC: 3.8 MMOL/L — SIGNIFICANT CHANGE UP (ref 3.5–5.3)
POTASSIUM SERPL-SCNC: 3.8 MMOL/L — SIGNIFICANT CHANGE UP (ref 3.5–5.3)
RBC # BLD: 4.67 M/UL — SIGNIFICANT CHANGE UP (ref 4.2–5.8)
RBC # FLD: 13.7 % — SIGNIFICANT CHANGE UP (ref 10.3–14.5)
SODIUM SERPL-SCNC: 141 MMOL/L — SIGNIFICANT CHANGE UP (ref 135–145)
WBC # BLD: 5.52 K/UL — SIGNIFICANT CHANGE UP (ref 3.8–10.5)
WBC # FLD AUTO: 5.52 K/UL — SIGNIFICANT CHANGE UP (ref 3.8–10.5)

## 2018-12-07 PROCEDURE — 87040 BLOOD CULTURE FOR BACTERIA: CPT

## 2018-12-07 PROCEDURE — 87581 M.PNEUMON DNA AMP PROBE: CPT

## 2018-12-07 PROCEDURE — 83880 ASSAY OF NATRIURETIC PEPTIDE: CPT

## 2018-12-07 PROCEDURE — 82435 ASSAY OF BLOOD CHLORIDE: CPT

## 2018-12-07 PROCEDURE — 85014 HEMATOCRIT: CPT

## 2018-12-07 PROCEDURE — 93308 TTE F-UP OR LMTD: CPT

## 2018-12-07 PROCEDURE — 81001 URINALYSIS AUTO W/SCOPE: CPT

## 2018-12-07 PROCEDURE — 93970 EXTREMITY STUDY: CPT | Mod: 26

## 2018-12-07 PROCEDURE — 84132 ASSAY OF SERUM POTASSIUM: CPT

## 2018-12-07 PROCEDURE — 93005 ELECTROCARDIOGRAM TRACING: CPT

## 2018-12-07 PROCEDURE — 82330 ASSAY OF CALCIUM: CPT

## 2018-12-07 PROCEDURE — 87633 RESP VIRUS 12-25 TARGETS: CPT

## 2018-12-07 PROCEDURE — 82947 ASSAY GLUCOSE BLOOD QUANT: CPT

## 2018-12-07 PROCEDURE — 84100 ASSAY OF PHOSPHORUS: CPT

## 2018-12-07 PROCEDURE — 82803 BLOOD GASES ANY COMBINATION: CPT

## 2018-12-07 PROCEDURE — 99285 EMERGENCY DEPT VISIT HI MDM: CPT | Mod: 25

## 2018-12-07 PROCEDURE — 71045 X-RAY EXAM CHEST 1 VIEW: CPT

## 2018-12-07 PROCEDURE — 83036 HEMOGLOBIN GLYCOSYLATED A1C: CPT

## 2018-12-07 PROCEDURE — 85610 PROTHROMBIN TIME: CPT

## 2018-12-07 PROCEDURE — 82607 VITAMIN B-12: CPT

## 2018-12-07 PROCEDURE — 83735 ASSAY OF MAGNESIUM: CPT

## 2018-12-07 PROCEDURE — 82746 ASSAY OF FOLIC ACID SERUM: CPT

## 2018-12-07 PROCEDURE — 96374 THER/PROPH/DIAG INJ IV PUSH: CPT | Mod: XU

## 2018-12-07 PROCEDURE — 97161 PT EVAL LOW COMPLEX 20 MIN: CPT

## 2018-12-07 PROCEDURE — 90686 IIV4 VACC NO PRSV 0.5 ML IM: CPT

## 2018-12-07 PROCEDURE — 83605 ASSAY OF LACTIC ACID: CPT

## 2018-12-07 PROCEDURE — 87798 DETECT AGENT NOS DNA AMP: CPT

## 2018-12-07 PROCEDURE — 85730 THROMBOPLASTIN TIME PARTIAL: CPT

## 2018-12-07 PROCEDURE — 85027 COMPLETE CBC AUTOMATED: CPT

## 2018-12-07 PROCEDURE — 82962 GLUCOSE BLOOD TEST: CPT

## 2018-12-07 PROCEDURE — 80053 COMPREHEN METABOLIC PANEL: CPT

## 2018-12-07 PROCEDURE — 84295 ASSAY OF SERUM SODIUM: CPT

## 2018-12-07 PROCEDURE — 80048 BASIC METABOLIC PNL TOTAL CA: CPT

## 2018-12-07 PROCEDURE — 71275 CT ANGIOGRAPHY CHEST: CPT

## 2018-12-07 PROCEDURE — 84443 ASSAY THYROID STIM HORMONE: CPT

## 2018-12-07 PROCEDURE — 87150 DNA/RNA AMPLIFIED PROBE: CPT

## 2018-12-07 PROCEDURE — 87486 CHLMYD PNEUM DNA AMP PROBE: CPT

## 2018-12-07 PROCEDURE — 94640 AIRWAY INHALATION TREATMENT: CPT

## 2018-12-07 PROCEDURE — 84484 ASSAY OF TROPONIN QUANT: CPT

## 2018-12-07 PROCEDURE — 93970 EXTREMITY STUDY: CPT

## 2018-12-07 RX ORDER — FUROSEMIDE 40 MG
1 TABLET ORAL
Qty: 0 | Refills: 0 | COMMUNITY

## 2018-12-07 RX ORDER — RIVAROXABAN 15 MG-20MG
1 KIT ORAL
Qty: 30 | Refills: 0 | OUTPATIENT
Start: 2018-12-07 | End: 2019-01-05

## 2018-12-07 RX ORDER — RIVAROXABAN 15 MG-20MG
1 KIT ORAL
Qty: 0 | Refills: 0 | COMMUNITY

## 2018-12-07 RX ORDER — ALBUTEROL 90 UG/1
2 AEROSOL, METERED ORAL
Qty: 0 | Refills: 0 | COMMUNITY

## 2018-12-07 RX ADMIN — Medication 650 MILLIGRAM(S): at 06:59

## 2018-12-07 RX ADMIN — Medication 325 MILLIGRAM(S): at 12:28

## 2018-12-07 RX ADMIN — FINASTERIDE 5 MILLIGRAM(S): 5 TABLET, FILM COATED ORAL at 12:28

## 2018-12-07 RX ADMIN — Medication 2: at 16:43

## 2018-12-07 RX ADMIN — FEBUXOSTAT 40 MILLIGRAM(S): 40 TABLET ORAL at 12:28

## 2018-12-07 RX ADMIN — RIVAROXABAN 15 MILLIGRAM(S): KIT at 16:43

## 2018-12-07 RX ADMIN — ISOSORBIDE MONONITRATE 30 MILLIGRAM(S): 60 TABLET, EXTENDED RELEASE ORAL at 12:28

## 2018-12-07 RX ADMIN — MIRABEGRON 25 MILLIGRAM(S): 50 TABLET, EXTENDED RELEASE ORAL at 12:27

## 2018-12-07 RX ADMIN — Medication 2: at 08:34

## 2018-12-07 RX ADMIN — Medication 650 MILLIGRAM(S): at 05:04

## 2018-12-07 RX ADMIN — Medication 20 MILLIGRAM(S): at 05:04

## 2018-12-07 RX ADMIN — LOSARTAN POTASSIUM 100 MILLIGRAM(S): 100 TABLET, FILM COATED ORAL at 05:04

## 2018-12-07 RX ADMIN — Medication 650 MILLIGRAM(S): at 13:34

## 2018-12-07 RX ADMIN — Medication 2: at 12:28

## 2018-12-07 RX ADMIN — ERGOCALCIFEROL 50000 UNIT(S): 1.25 CAPSULE ORAL at 12:28

## 2018-12-07 RX ADMIN — INFLUENZA VIRUS VACCINE 0.5 MILLILITER(S): 15; 15; 15; 15 SUSPENSION INTRAMUSCULAR at 16:43

## 2018-12-07 RX ADMIN — Medication 650 MILLIGRAM(S): at 14:15

## 2018-12-07 NOTE — DISCHARGE NOTE ADULT - NS AS ACTIVITY OBS
Bathing allowed/Resume activity as tolerated./Walking-Indoors allowed/Walking-Outdoors allowed/Showering allowed

## 2018-12-07 NOTE — DISCHARGE NOTE ADULT - PATIENT PORTAL LINK FT
You can access the [facility] Patient Portal, offered by [facility], by registering with the following website: [email or web address]

## 2018-12-07 NOTE — DISCHARGE NOTE ADULT - CARE PROVIDER_API CALL
Dong Sanford), Internal Medicine; Pulmonary Disease  [address], [place]   Phone: [phone]  Fax: [phone]

## 2018-12-07 NOTE — PROGRESS NOTE ADULT - ASSESSMENT
Acute on chronic diastolic CHF due to MR/AS  Hypoxemia and dyspnea: resolved  Ill defined LLE pain - ? musculoskeletal    REC    CLear for DC from pulm POV  No need for home O2  CHF managment per primary team  DC planning

## 2018-12-07 NOTE — CHART NOTE - NSCHARTNOTEFT_GEN_A_CORE
Due to patients deconditioning and generalized weakness secondary to Congestive heart failure. Patient will require a transport chair. Patient is unable to self-propel in a standard wheelchair. This is necessary to achieve daily tasks and therapies which cannot be achieved with the use of a cane or rolling walker. Patient and family are in agreement with wheelchair use at home and assistance will be provided if needed.     Rhonda Laughlin PA-C   Dept of Medicine   Spectra: [phone]

## 2018-12-07 NOTE — DISCHARGE NOTE ADULT - ADDITIONAL INSTRUCTIONS
Follow up with primary care doctor within 1-2 weeks of discharge   Follow up with cardiologist within 1-2 weeks of discharge.

## 2018-12-07 NOTE — DISCHARGE NOTE ADULT - PLAN OF CARE
Stable Left Lower extremity knee pain has been better controlled with Tylenol. Continue taking extra strength Tylenol as needed for knee pain up to 1000mg every 4-6 hours as needed. Follow up with your primary care doctor within 1-2 weeks of discharge. You have congestive heart failure which means your heart does not pump as adequately as it should. Weigh yourself daily as small changes in weight can affect your fluid status.   If you gain 3lbs in 3 days, or 5lbs in a week call your Health Care Provider.  Do not eat or drink foods containing more than 2000mg of salt (sodium) in your diet every day.  Call your Health Care Provider if you have any swelling or increased swelling in your feet, ankles, and/or stomach.  Take all of your medication as directed.  If you become dizzy call your Health Care Provider. Coronary artery disease is a condition where the arteries the supply the heart muscle get clogged with fatty deposits & puts you at risk for a heart attack  Call your doctor if you have any new pain, pressure, or discomfort in the center of your chest, pain, tingling or discomfort in arms, back, neck, jaw, or stomach, shortness of breath, nausea, vomiting, burping or heartburn, sweating, cold and clammy skin, racing or abnormal heartbeat for more than 10 minutes or if they keep coming & going.  Call 911 and do not try to get to hospital by driving yourself.  You can help yourself with lifestyle changes  such as quitting smoking if you smoke, eating lots of fruits & vegetables, low fat dairy products, with decreased intake of meat & fatty foods. Increase exercise with walking  or another form of physical activity on most days of the week. Losing weight if you are overweight can help.   Take your cardiac medication as prescribed to lower cholesterol and to lower blood pressure. Take aspirin to prevent blood clots and promote heart health, and take diabetes medication if prescribed for better control  Make sure to keep appointments with doctor for cardiac follow up care When you have diabetes, a way to monitor your blood sugars is checking your hemoglobin A1c level. A level greater than 6.5 indicates diabetes, however may be okay when you are taking medications. HgA1C this admission: 7.3  Make sure you get your HgA1c checked every three months.  While taking oral diabetes medications, check your blood glucose two times a day.  If you take insulin, check your blood glucose before meals and at bedtime.  It's important not to skip any meals or you risk your blood sugar level dropping too low.   Low blood sugar (hypoglycemia) is a blood sugar below 70mg/dl. Check your blood sugar if you feel signs/symptoms of hypoglycemia. If your blood sugar is below 70 take 15 grams of carbohydrates (ex 4 oz of apple juice, 3-4 glucose tablets, or 4-6 oz of regular soda) wait 15 minutes and repeat blood sugar to make sure it comes up above 70.  If your blood sugar is above 70 and you are due for a meal, have a meal.  If you are not due for a meal have a snack.  This snack helps keeps your blood sugar at a safe range.  Keep a log of your blood glucose results and always take it with you to your doctor appointments.  Keep a list of your current medications including injectables and over the counter medications and bring this medication list with you to all your doctor appointments.  If you have not seen your ophthalmologist this year call for appointment. You should see your eye doctor annually to avoid any eye complications from your diabetes.   Check your feet daily for redness, sores, or openings. Do not self treat any sores or ulcers you notice. If there is no improvement in two days of any lesions, call your primary care physician for an appointment. It is recommended that you follow up with a podiatrist regularly to avoid any vascular complications of your diabetes.

## 2018-12-07 NOTE — PROGRESS NOTE ADULT - SUBJECTIVE AND OBJECTIVE BOX
Follow-up Pulm Progress Note  Dong Sanford MD  [phone]    No new respiratory events overnight.  Denies SOB/CP.   Comfortable OOB in chair: O2 sat 97% on RA    Medications:  Vital Signs Last 24 Hrs  T(C): 36.7 (07 Dec 2018 11:26), Max: 36.7 (06 Dec 2018 20:36)  T(F): 98.1 (07 Dec 2018 11:26), Max: 98.1 (06 Dec 2018 20:36)  HR: 66 (07 Dec 2018 11:26) (53 - 66)  BP: 152/65 (07 Dec 2018 11:26) (144/74 - 160/77)  BP(mean): --  RR: 18 (07 Dec 2018 11:26) (18 - 18)  SpO2: 94% (07 Dec 2018 11:26) (93% - 94%)      12-06 @ 07:01  -  12-07 @ 07:00  --------------------------------------------------------  IN: 960 mL / OUT: 200 mL / NET: 760 mL        LABS:                        13.8   5.52  )-----------( 200      ( 07 Dec 2018 09:17 )             40.6     12-07    141  |  105  |  28<H>  ----------------------------<  179<H>  3.8   |  23  |  1.47<H>    Ca    9.2      07 Dec 2018 07:16  Phos  3.3     12-06  Mg     2.0     12-06    PTT - ( 06 Dec 2018 09:09 )  PTT:41.5 sec    Serum Pro-Brain Natriuretic Peptide: 879 pg/mL (12-05-18 @ 11:14)  CULTURES:  Culture Results:   No growth to date. (12-05 @ 15:12)  Culture Results:   No growth to date. (12-05 @ 15:12)    Most recent blood culture -- 12-05 @ 15:12   -- -- .Blood Blood-Peripheral 12-05 @ 15:12      Physical Examination:  PULM: Clear  CVS: Regular rate and rhythm, no murmurs, rubs, or gallops  ABD: Soft, non-tender  EXT:  No clubbing, cyanosis, or edema    RADIOLOGY REVIEWED  CXR:    CT chest:    TTE:
Patient is a 86y old  Male who presents with a chief complaint of sob (07 Dec 2018 10:59)      SUBJECTIVE / OVERNIGHT EVENTS:   No chest pain. No shortness of breath. No complaints. No events overnight. feels better. walking around.     MEDICATIONS  (STANDING):  acetaminophen   Tablet .. 650 milliGRAM(s) Oral three times a day  dextrose 5%. 1000 milliLiter(s) (50 mL/Hr) IV Continuous <Continuous>  dextrose 50% Injectable 12.5 Gram(s) IV Push once  dextrose 50% Injectable 25 Gram(s) IV Push once  dextrose 50% Injectable 25 Gram(s) IV Push once  ergocalciferol 88649 Unit(s) Oral every week  febuxostat 40 milliGRAM(s) Oral daily  ferrous    sulfate 325 milliGRAM(s) Oral daily  finasteride 5 milliGRAM(s) Oral daily  furosemide   Injectable 20 milliGRAM(s) IV Push daily  influenza   Vaccine 0.5 milliLiter(s) IntraMuscular once  insulin lispro (HumaLOG) corrective regimen sliding scale   SubCutaneous three times a day before meals  insulin lispro (HumaLOG) corrective regimen sliding scale   SubCutaneous at bedtime  isosorbide   mononitrate ER Tablet (IMDUR) 30 milliGRAM(s) Oral daily  losartan 100 milliGRAM(s) Oral daily  mirabegron ER 25 milliGRAM(s) Oral daily  NIFEdipine XL 60 milliGRAM(s) Oral at bedtime  rivaroxaban 15 milliGRAM(s) Oral every 24 hours  tamsulosin 0.4 milliGRAM(s) Oral at bedtime    MEDICATIONS  (PRN):  ALBUTerol    90 MICROgram(s) HFA Inhaler 2 Puff(s) Inhalation every 6 hours PRN Wheezing  dextrose 40% Gel 15 Gram(s) Oral once PRN Blood Glucose LESS THAN 70 milliGRAM(s)/deciliter  glucagon  Injectable 1 milliGRAM(s) IntraMuscular once PRN Glucose LESS THAN 70 milligrams/deciliter      Vital Signs Last 24 Hrs  T(C): 36.4 (07 Dec 2018 03:56), Max: 36.7 (06 Dec 2018 20:36)  T(F): 97.5 (07 Dec 2018 03:56), Max: 98.1 (06 Dec 2018 20:36)  HR: 59 (07 Dec 2018 03:56) (48 - 59)  BP: 156/72 (07 Dec 2018 03:56) (144/74 - 160/77)  BP(mean): --  RR: 18 (07 Dec 2018 03:56) (17 - 18)  SpO2: 94% (07 Dec 2018 03:56) (93% - 96%)  CAPILLARY BLOOD GLUCOSE      POCT Blood Glucose.: 205 mg/dL (07 Dec 2018 08:30)  POCT Blood Glucose.: 138 mg/dL (06 Dec 2018 21:43)  POCT Blood Glucose.: 229 mg/dL (06 Dec 2018 16:40)  POCT Blood Glucose.: 161 mg/dL (06 Dec 2018 12:12)    I&O's Summary    06 Dec 2018 07:01  -  07 Dec 2018 07:00  --------------------------------------------------------  IN: 960 mL / OUT: 200 mL / NET: 760 mL        PHYSICAL EXAM:  GENERAL: NAD, well-developed  HEAD:  Atraumatic, Normocephalic  EYES: EOMI, PERRLA, conjunctiva and sclera clear  NECK: Supple, No JVD  CHEST/LUNG: Clear to auscultation bilaterally; No wheeze  HEART: Regular rate and rhythm; No murmurs, rubs, or gallops  ABDOMEN: Soft, Nontender, Nondistended; Bowel sounds present  EXTREMITIES:  2+ Peripheral Pulses, No clubbing, cyanosis, or edema  PSYCH: AAOx3  NEUROLOGY: non-focal  SKIN: No rashes or lesions    LABS:                        13.8   5.52  )-----------( 200      ( 07 Dec 2018 09:17 )             40.6     12-    141  |  105  |  28<H>  ----------------------------<  179<H>  3.8   |  23  |  1.47<H>    Ca    9.2      07 Dec 2018 07:16  Phos  3.3     12-06  Mg     2.0     12-06      PTT - ( 06 Dec 2018 09:09 )  PTT:41.5 sec      Urinalysis Basic - ( 05 Dec 2018 16:24 )    Color: Colorless / Appearance: Clear / S.019 / pH: x  Gluc: x / Ketone: Negative  / Bili: Negative / Urobili: Negative   Blood: x / Protein: 100 mg/dL / Nitrite: Negative   Leuk Esterase: Negative / RBC: 2 /hpf / WBC 0 /hpf   Sq Epi: x / Non Sq Epi: 0 /hpf / Bacteria: Negative        RADIOLOGY & ADDITIONAL TESTS:    Imaging Personally Reviewed:    Consultant(s) Notes Reviewed:      Care Discussed with Consultants/Other Providers:
Patient is a 86y old  Male who presents with a chief complaint of sob (05 Dec 2018 18:32)      SUBJECTIVE / OVERNIGHT EVENTS: denies chest pain or sob.  c/o deejay lower ext pain and difficulty walking    MEDICATIONS  (STANDING):  dextrose 5%. 1000 milliLiter(s) (50 mL/Hr) IV Continuous <Continuous>  dextrose 50% Injectable 12.5 Gram(s) IV Push once  dextrose 50% Injectable 25 Gram(s) IV Push once  dextrose 50% Injectable 25 Gram(s) IV Push once  furosemide   Injectable 20 milliGRAM(s) IV Push daily  influenza   Vaccine 0.5 milliLiter(s) IntraMuscular once  insulin lispro (HumaLOG) corrective regimen sliding scale   SubCutaneous three times a day before meals  insulin lispro (HumaLOG) corrective regimen sliding scale   SubCutaneous at bedtime  isosorbide   mononitrate ER Tablet (IMDUR) 30 milliGRAM(s) Oral daily  losartan 100 milliGRAM(s) Oral daily  NIFEdipine XL 60 milliGRAM(s) Oral at bedtime  rivaroxaban 15 milliGRAM(s) Oral every 24 hours  tamsulosin 0.4 milliGRAM(s) Oral at bedtime    MEDICATIONS  (PRN):  dextrose 40% Gel 15 Gram(s) Oral once PRN Blood Glucose LESS THAN 70 milliGRAM(s)/deciliter  glucagon  Injectable 1 milliGRAM(s) IntraMuscular once PRN Glucose LESS THAN 70 milligrams/deciliter      Vital Signs Last 24 Hrs  T(C): 36.6 (06 Dec 2018 11:26), Max: 36.8 (05 Dec 2018 18:31)  T(F): 97.8 (06 Dec 2018 11:26), Max: 98.3 (05 Dec 2018 18:31)  HR: 48 (06 Dec 2018 11:26) (48 - 71)  BP: 152/62 (06 Dec 2018 11:26) (151/79 - 191/74)  BP(mean): --  RR: 17 (06 Dec 2018 11:26) (17 - 22)  SpO2: 96% (06 Dec 2018 11:26) (90% - 98%)  CAPILLARY BLOOD GLUCOSE      POCT Blood Glucose.: 161 mg/dL (06 Dec 2018 12:12)  POCT Blood Glucose.: 159 mg/dL (06 Dec 2018 07:49)  POCT Blood Glucose.: 175 mg/dL (05 Dec 2018 21:36)  POCT Blood Glucose.: 185 mg/dL (05 Dec 2018 18:28)    I&O's Summary    05 Dec 2018 07:01  -  06 Dec 2018 07:00  --------------------------------------------------------  IN: 0 mL / OUT: 1200 mL / NET: -1200 mL        PHYSICAL EXAM:  GENERAL: NAD, well-developed  HEAD:  Atraumatic, Normocephalic  EYES: EOMI, PERRLA, conjunctiva and sclera clear  NECK: Supple, No JVD  CHEST/LUNG: Clear to auscultation bilaterally; No wheeze  HEART: Regular rate and rhythm; No murmurs, rubs, or gallops  ABDOMEN: Soft, Nontender, Nondistended; Bowel sounds present  EXTREMITIES:  2+ Peripheral Pulses, No clubbing, cyanosis, or edema  PSYCH: AAOx3  NEUROLOGY: non-focal  SKIN: No rashes or lesions    LABS:                        14.0   7.27  )-----------( 201      ( 06 Dec 2018 09:09 )             40.0     12-06    140  |  103  |  21  ----------------------------<  149<H>  3.7   |  21<L>  |  1.34<H>    Ca    9.3      06 Dec 2018 07:16  Phos  3.3     12-06  Mg     2.0     12-06    TPro  7.4  /  Alb  4.5  /  TBili  0.9  /  DBili  x   /  AST  37  /  ALT  29  /  AlkPhos  138<H>  12-05    PT/INR - ( 05 Dec 2018 11:14 )   PT: 16.0 sec;   INR: 1.39 ratio         PTT - ( 06 Dec 2018 09:09 )  PTT:41.5 sec      Urinalysis Basic - ( 05 Dec 2018 16:24 )    Color: Colorless / Appearance: Clear / S.019 / pH: x  Gluc: x / Ketone: Negative  / Bili: Negative / Urobili: Negative   Blood: x / Protein: 100 mg/dL / Nitrite: Negative   Leuk Esterase: Negative / RBC: 2 /hpf / WBC 0 /hpf   Sq Epi: x / Non Sq Epi: 0 /hpf / Bacteria: Negative        RADIOLOGY & ADDITIONAL TESTS:    Imaging Personally Reviewed:    Consultant(s) Notes Reviewed:      Care Discussed with Consultants/Other Providers:

## 2018-12-07 NOTE — DISCHARGE NOTE ADULT - MEDICATION SUMMARY - MEDICATIONS TO CHANGE
I will SWITCH the dose or number of times a day I take the medications listed below when I get home from the hospital:    Xarelto 20 mg oral tablet  -- 1 tab(s) by mouth once a day

## 2018-12-07 NOTE — DISCHARGE NOTE ADULT - MEDICATION SUMMARY - MEDICATIONS TO TAKE
I will START or STAY ON the medications listed below when I get home from the hospital:    Diovan 160 mg oral tablet  -- 1 tab(s) by mouth once a day  -- Indication: For Hypertension (High blood pressure)     dutasteride-tamsulosin 0.5 mg-0.4 mg oral capsule  -- 1 cap(s) by mouth once a day  -- Indication: For BPH     isosorbide mononitrate 30 mg oral tablet, extended release  -- 1 tab(s) by mouth once a day (in the morning)  -- Indication: For CHF    Xarelto 20 mg oral tablet  -- 1 tab(s) by mouth once a day  -- Indication: For Afib     glipiZIDE 5 mg oral tablet  -- 1 tab(s) by mouth once a day  -- Indication: For Diabetes mellitus type 2     metFORMIN 500 mg oral tablet  -- 1 tab(s) by mouth 2 times a day  -- Indication: For Diabetes mellitus type 2     rosuvastatin 10 mg oral tablet  -- 1 tab(s) by mouth once a day  -- Indication: For Hyperlipidemia     Uloric 40 mg oral tablet  -- 1 tab(s) by mouth once a day  -- Indication: For Hyperuricemia    ProAir HFA 90 mcg/inh inhalation aerosol  -- 2 puff(s) inhaled 4 times a day, As Needed  -- Indication: For Shortness of breath     NIFEdipine 60 mg oral tablet, extended release  -- 1 tab(s) by mouth once a day  -- Indication: For Hypertension (High blood pressure)     Voltaren 1% topical gel  -- Apply on skin to affected area daily  -- Indication: For Pain, as needed     furosemide 20 mg oral tablet  -- 1 tab(s) by mouth once a day  -- Indication: For Acute on chronic congestive heart failure, unspecified heart failure type    ferrous sulfate 325 mg (65 mg elemental iron) oral delayed release tablet  -- 1 tab(s) by mouth once a day  -- Indication: For Iron deficiency anemia     Fish Oil oral capsule  -- 1 cap(s) by mouth 2 times a day  -- Indication: For Heart health     Myrbetriq 25 mg oral tablet, extended release  -- 1 tab(s) by mouth every other day  -- Indication: For Overactive bladder

## 2018-12-07 NOTE — DISCHARGE NOTE ADULT - SECONDARY DIAGNOSIS.
Acute on chronic congestive heart failure, unspecified heart failure type CAD (coronary artery disease) Diabetes mellitus

## 2018-12-07 NOTE — DISCHARGE NOTE ADULT - HOSPITAL COURSE
85 y/o M w/ PMHx CHF, Afib on Xarelto 20mg presenting with SOB. Per family member pt was feeling well yesterday. This AM, felt okay and then became SOB. Denies fevers, chest pain, productive cough, pain with inspiration, sick contacts, N/V/D, reports of weight gain, LE swelling, back pain. Per wife noticed was breathing faster this AM. Pt also reports experiencing left knee pain and denies induced trauma. Pt SOB has resolved during treatment with IV Lasix. Transitioned IV over to oral medications/home dose. Still reports knee pain that is decreased with Tylenol. Lower extremity Doppler was:    PT consult recommended subacute rehab, however patient and son are declining rehab and home with home physical therapy has been arranged for gait, balance, & strength training and to return pt to baseline functional mobility status. Pt has rolling walker, and transport chair and commode have been arranged to be sent to home. Otherwise stable for safe discharge home. 85 y/o M w/ PMHx CHF, Afib on Xarelto 20mg presenting with SOB. Per family member pt was feeling well yesterday. This AM, felt okay and then became SOB. Denies fevers, chest pain, productive cough, pain with inspiration, sick contacts, N/V/D, reports of weight gain, LE swelling, back pain. Per wife noticed was breathing faster this AM. Pt also reports experiencing left knee pain and denies induced trauma. Pt SOB has resolved during treatment with IV Lasix. Transitioned IV over to oral medications/home dose. Still reports knee pain that is decreased with Tylenol. Lower extremity Doppler was: negative for DVT. Likely arthritic pain.   PT consult recommended subacute rehab, however patient and son are declining rehab and home with home physical therapy has been arranged for gait, balance, & strength training and to return pt to baseline functional mobility status. Pt has rolling walker, and transport chair and commode have been arranged to be sent to home. Otherwise stable for safe discharge home.

## 2018-12-07 NOTE — DISCHARGE NOTE ADULT - CARE PLAN
Principal Discharge DX:	Lower extremity pain  Goal:	Stable  Assessment and plan of treatment:	Left Lower extremity knee pain has been better controlled with Tylenol. Continue taking extra strength Tylenol as needed for knee pain up to 1000mg every 4-6 hours as needed. Follow up with your primary care doctor within 1-2 weeks of discharge.  Secondary Diagnosis:	Acute on chronic congestive heart failure, unspecified heart failure type  Assessment and plan of treatment:	You have congestive heart failure which means your heart does not pump as adequately as it should. Weigh yourself daily as small changes in weight can affect your fluid status.   If you gain 3lbs in 3 days, or 5lbs in a week call your Health Care Provider.  Do not eat or drink foods containing more than 2000mg of salt (sodium) in your diet every day.  Call your Health Care Provider if you have any swelling or increased swelling in your feet, ankles, and/or stomach.  Take all of your medication as directed.  If you become dizzy call your Health Care Provider.  Secondary Diagnosis:	CAD (coronary artery disease)  Assessment and plan of treatment:	Coronary artery disease is a condition where the arteries the supply the heart muscle get clogged with fatty deposits & puts you at risk for a heart attack  Call your doctor if you have any new pain, pressure, or discomfort in the center of your chest, pain, tingling or discomfort in arms, back, neck, jaw, or stomach, shortness of breath, nausea, vomiting, burping or heartburn, sweating, cold and clammy skin, racing or abnormal heartbeat for more than 10 minutes or if they keep coming & going.  Call 911 and do not try to get to hospital by driving yourself.  You can help yourself with lifestyle changes  such as quitting smoking if you smoke, eating lots of fruits & vegetables, low fat dairy products, with decreased intake of meat & fatty foods. Increase exercise with walking  or another form of physical activity on most days of the week. Losing weight if you are overweight can help.   Take your cardiac medication as prescribed to lower cholesterol and to lower blood pressure. Take aspirin to prevent blood clots and promote heart health, and take diabetes medication if prescribed for better control  Make sure to keep appointments with doctor for cardiac follow up care  Secondary Diagnosis:	Diabetes mellitus  Assessment and plan of treatment:	When you have diabetes, a way to monitor your blood sugars is checking your hemoglobin A1c level. A level greater than 6.5 indicates diabetes, however may be okay when you are taking medications. HgA1C this admission: 7.3  Make sure you get your HgA1c checked every three months.  While taking oral diabetes medications, check your blood glucose two times a day.  If you take insulin, check your blood glucose before meals and at bedtime.  It's important not to skip any meals or you risk your blood sugar level dropping too low.   Low blood sugar (hypoglycemia) is a blood sugar below 70mg/dl. Check your blood sugar if you feel signs/symptoms of hypoglycemia. If your blood sugar is below 70 take 15 grams of carbohydrates (ex 4 oz of apple juice, 3-4 glucose tablets, or 4-6 oz of regular soda) wait 15 minutes and repeat blood sugar to make sure it comes up above 70.  If your blood sugar is above 70 and you are due for a meal, have a meal.  If you are not due for a meal have a snack.  This snack helps keeps your blood sugar at a safe range.  Keep a log of your blood glucose results and always take it with you to your doctor appointments.  Keep a list of your current medications including injectables and over the counter medications and bring this medication list with you to all your doctor appointments.  If you have not seen your ophthalmologist this year call for appointment. You should see your eye doctor annually to avoid any eye complications from your diabetes.   Check your feet daily for redness, sores, or openings. Do not self treat any sores or ulcers you notice. If there is no improvement in two days of any lesions, call your primary care physician for an appointment. It is recommended that you follow up with a podiatrist regularly to avoid any vascular complications of your diabetes.

## 2018-12-07 NOTE — PROGRESS NOTE ADULT - ASSESSMENT
85 y/o male h/o CHF, afib on coumadin presenting with sob. per family member pt was feeling well yesterday. this am felt okay and then became sob. no fevers. denies any chest pain. is on coumadin. no productive cough. no pain with inspiration. no sick contacts. no n/v/d. no reports of weight gain. no LE swelling. no back pain. per wife noticed was breathing faster this am. also reports left knee pain. no trauma.     acute on chronic diastolic CHF  - cont lasix change to po  - strict I's and O's    cad  - c/w asa  - c/w imdur    afib  - c/w xarelto    HTN  - cw nifedipine and diovan    diabetes  - fs qid  - hgb a1c  - insulin ss    bph  - cw flomax    deejay lwer ext pain likely secondary to OA  - tylenol bid atc    pt eval    discharge home with home care 85 y/o male h/o CHF, afib on coumadin presenting with sob. per family member pt was feeling well yesterday. this am felt okay and then became sob. no fevers. denies any chest pain. is on coumadin. no productive cough. no pain with inspiration. no sick contacts. no n/v/d. no reports of weight gain. no LE swelling. no back pain. per wife noticed was breathing faster this am. also reports left knee pain. no trauma.     acute on chronic diastolic CHF  - cont lasix change to po  - strict I's and O's    cad  - c/w asa  - c/w imdur    afib  - c/w xarelto    HTN  - cw nifedipine and diovan    diabetes  - fs qid  - hgb a1c  - insulin ss    bph  - cw flomax    deejay lwer ext pain likely secondary to OA  - tylenol bid atc    pt eval  pt and son declined [facility].  discharge home with home care

## 2018-12-07 NOTE — DISCHARGE NOTE ADULT - INSTRUCTIONS
No fluid restrictions. Resume soft diet with diabetic precautions. Avoid sugar sweetened beverages, candy, pasta, white bread, white rice, added salt, frozen dinners, canned soups and broths, foods fried in oil, and cured meats such as ham and amato.

## 2018-12-09 LAB
-  CANDIDA ALBICANS: SIGNIFICANT CHANGE UP
-  CANDIDA GLABRATA: SIGNIFICANT CHANGE UP
-  CANDIDA KRUSEI: SIGNIFICANT CHANGE UP
-  CANDIDA PARAPSILOSIS: SIGNIFICANT CHANGE UP
-  CANDIDA TROPICALIS: SIGNIFICANT CHANGE UP
-  COAGULASE NEGATIVE STAPHYLOCOCCUS: SIGNIFICANT CHANGE UP
-  K. PNEUMONIAE GROUP: SIGNIFICANT CHANGE UP
-  KPC RESISTANCE GENE: SIGNIFICANT CHANGE UP
-  STREPTOCOCCUS SP. (NOT GRP A, B OR S PNEUMONIAE): SIGNIFICANT CHANGE UP
A BAUMANNII DNA SPEC QL NAA+PROBE: SIGNIFICANT CHANGE UP
E CLOAC COMP DNA BLD POS QL NAA+PROBE: SIGNIFICANT CHANGE UP
E COLI DNA BLD POS QL NAA+NON-PROBE: SIGNIFICANT CHANGE UP
ENTEROCOC DNA BLD POS QL NAA+NON-PROBE: SIGNIFICANT CHANGE UP
ENTEROCOC DNA BLD POS QL NAA+NON-PROBE: SIGNIFICANT CHANGE UP
GP B STREP DNA BLD POS QL NAA+NON-PROBE: SIGNIFICANT CHANGE UP
GRAM STN FLD: SIGNIFICANT CHANGE UP
HAEM INFLU DNA BLD POS QL NAA+NON-PROBE: SIGNIFICANT CHANGE UP
K OXYTOCA DNA BLD POS QL NAA+NON-PROBE: SIGNIFICANT CHANGE UP
L MONOCYTOG DNA BLD POS QL NAA+NON-PROBE: SIGNIFICANT CHANGE UP
METHOD TYPE: SIGNIFICANT CHANGE UP
MRSA SPEC QL CULT: SIGNIFICANT CHANGE UP
MSSA DNA SPEC QL NAA+PROBE: SIGNIFICANT CHANGE UP
N MEN ISLT CULT: SIGNIFICANT CHANGE UP
P AERUGINOSA DNA BLD POS NAA+NON-PROBE: SIGNIFICANT CHANGE UP
S MARCESCENS DNA BLD POS NAA+NON-PROBE: SIGNIFICANT CHANGE UP
S PNEUM DNA BLD POS QL NAA+NON-PROBE: SIGNIFICANT CHANGE UP
S PYO DNA BLD POS QL NAA+NON-PROBE: SIGNIFICANT CHANGE UP

## 2018-12-10 LAB
CULTURE RESULTS: SIGNIFICANT CHANGE UP
CULTURE RESULTS: SIGNIFICANT CHANGE UP
ORGANISM # SPEC MICROSCOPIC CNT: SIGNIFICANT CHANGE UP
ORGANISM # SPEC MICROSCOPIC CNT: SIGNIFICANT CHANGE UP
SPECIMEN SOURCE: SIGNIFICANT CHANGE UP
SPECIMEN SOURCE: SIGNIFICANT CHANGE UP

## 2020-01-01 NOTE — ED ADULT NURSE NOTE - TEMPLATE
Patients mom notified, she stated understanding , no further questions.
This is still normal for him at his age. His BMs can be any color of green, yellow or brown and any consistency from runny to mushy and have sesame seed looking pieces in them and this is all normal. The frequency is not really important, he can really have a BM with each feed and it is still normal at his age.  The change in the formula may be why she sees the change in his BMs
Respiratory

## 2020-01-24 NOTE — ED ADULT NURSE NOTE - NSSISCREENINGQ3_ED_A_ED
"  History     Chief Complaint:  Eye Problem    HPI   Andi León is a 21 year old male who presents  for evaluation of left eye pain.  He states that just prior to arrival, he was at work cleaning the water fountain, when the cleaning solution got into his left eye.  He was using a blue-colored \"bathroom \" which was a mixture of chemicals to clean hard water stains.  He states that he cleaned the eye out and removed his contact, but continued to have pain, prompting his evaluation.  He states that his vision is affected secondary to him not  wearing a contact lens in his left eye.  He  notes that is actually starting to feel better upon my evaluation.  He denies any other concerns or injuries.    Allergies:  No Known Allergies     Medications:    Ibuprofen    Problem List:    There are no active problems to display for this patient.       Past Medical History:    History reviewed. No pertinent past medical history.    Past Surgical History:    History reviewed. No pertinent surgical history.    Family History:    History reviewed. No pertinent family history.    Social History:  Marital Status:  Single [1]  Social History     Tobacco Use     Smoking status: Never Smoker     Smokeless tobacco: Never Used   Substance Use Topics     Alcohol use: Yes     Drug use: None        Review of Systems   Eyes: Positive for pain and redness.   All other systems reviewed and are negative.    Physical Exam   First Vitals:  BP: (!) 149/92  Heart Rate: 73  Temp: 98.2  F (36.8  C)  Resp: 18  Height: 188 cm (6' 2\")  Weight: 79.4 kg (175 lb)  SpO2: 97 %    Physical Exam  General: Resting comfortably.  Alert and oriented.   Head:  The scalp, face, and head appear normal   Eyes:  The pupils are equal, round, and reactive to light     Extraocular muscles are intact    Conjunctival injection noted to the left eye.    There is some tearing to the left eye.    Eye pressures: Right-20, left-19.    pH: Right-7, left-7.      Visual " acuity: Right-20/40, left-unable to be assessed secondary to patient not wearing contact lenses.    There is a small, linear area of uptake noted to the 10 o'clock position of the cornea; this does not appear to be deep-seated.  Negative Cedrick sign.  No hyphema.   CV:  Regular rate and rhythm     Normal S1/S2  Resp:  Lungs are clear to auscultation    Non-labored    No rales or wheezing   MS:  Normal muscular tone   Skin:  No rash or acute skin lesions noted   Neuro: Speech is normal and fluent.     Emergency Department Course   Interventions:  Medications   proparacaine (ALCAINE) 0.5 % ophthalmic solution 1 drop (1 drop Left Eye Given 1/23/20 2032)   fluorescein (FUL-CASPER) ophthalmic strip 1 strip (1 strip Left Eye Given 1/23/20 2033)     Emergency Department Course:  The patient was seen and examined by myself as above.  Vital signs reviewed.  Nursing notes reviewed.  Woods lamp exam was performed.  Overall, I believe the patient is safe to be discharged home.  He will follow-up with ophthalmology in 2  for recheck of his symptoms do not improve. ciprofloxacin drops prescribed.  Red flags and return precautions were discussed with the patient, and he expressed understanding.  All questions were answered prior to discharge.  The patient understands and agrees with plan.       Impression & Plan    Medical Decision Making:  Andi León is a 21 year old male presents for evaluation left eye pain. Please refer to the HPI for full details.  Essentially, the patient got a cleaning agent in his left eye. Visual acuity is limited secondary the patient not having his contact lens in his left eye.  Eye pressures are normal. The exam is significant for abnormality on fluorescein exam. This is consistent with corneal injury likely 2/2 chemical exposure. The lids were everted and no foreign bodies in eyes or lids noted. No signs of open globe, acute glaucoma, or other serious eye disease.  Eye pH is 7 bilaterally.  The eye  was flushed despite the negative pH to ascertain complete removal of the offending agent.  He had complete resolution of symptoms after application of proparicaine drops. They does wear contacts and therefore I will be discharged home on ciprofloxacin ointment.  He will be discharged home.  He was asked to take Tylenol and Ibuprofen for discomfort.  He was asked to follow up with an ophthalmologist in 2 days for recheck. They will return immediately for fevers, vomiting, increased pain, periorbital edema/erythema, visual changes or any other concerns. All questions were answered prior to discharge. The patient understands and agrees to this plan.     Diagnosis:    ICD-10-CM    1. Chemical burn of left cornea, initial encounter T26.62XA        Disposition:  discharged to home    Discharge Medications:  New Prescriptions    CIPROFLOXACIN (CILOXAN) 0.3 % OPHTHALMIC SOLUTION    Place 1 drop Into the left eye 4 times daily       Ciara Christensen PA-C  1/23/2020    EMERGENCY DEPARTMENT       Ciara Christensen PA-C  01/23/20 [id number]     No

## 2020-12-24 ENCOUNTER — EMERGENCY (EMERGENCY)
Facility: HOSPITAL | Age: 85
LOS: 1 days | Discharge: HOME | End: 2020-12-24
Attending: EMERGENCY MEDICINE
Payer: MEDICARE

## 2020-12-24 VITALS
HEIGHT: 68 IN | RESPIRATION RATE: 16 BRPM | OXYGEN SATURATION: 96 % | WEIGHT: 199.96 LBS | TEMPERATURE: 99 F | SYSTOLIC BLOOD PRESSURE: 155 MMHG | DIASTOLIC BLOOD PRESSURE: 70 MMHG | HEART RATE: 76 BPM

## 2020-12-24 DIAGNOSIS — Z95.5 PRESENCE OF CORONARY ANGIOPLASTY IMPLANT AND GRAFT: Chronic | ICD-10-CM

## 2020-12-24 PROCEDURE — 99283 EMERGENCY DEPT VISIT LOW MDM: CPT

## 2020-12-24 RX ORDER — ACETAMINOPHEN 500 MG
650 TABLET ORAL ONCE
Refills: 0 | Status: COMPLETED | OUTPATIENT
Start: 2020-12-24 | End: 2020-12-24

## 2020-12-24 NOTE — ED ADULT TRIAGE NOTE - CHIEF COMPLAINT QUOTE
BIBA s/p fall from bed and not being able to get up, c/o lower back pain, denies head injury or use of blood thinners,  pt triggered his life alert, ems states pt was able to ambulate and dress himself with minor assistance once he was up, pt lives alone and has a HHA that comes in the morning

## 2020-12-25 PROCEDURE — 99284 EMERGENCY DEPT VISIT MOD MDM: CPT | Mod: 25

## 2020-12-25 PROCEDURE — 73564 X-RAY EXAM KNEE 4 OR MORE: CPT | Mod: 26,50

## 2020-12-25 PROCEDURE — 73564 X-RAY EXAM KNEE 4 OR MORE: CPT

## 2020-12-25 RX ADMIN — Medication 650 MILLIGRAM(S): at 00:12

## 2020-12-25 NOTE — ED PROVIDER NOTE - CARE PLAN
Principal Discharge DX:	Contusion of right knee, initial encounter  Goal:	and left knee. fall from standing

## 2020-12-25 NOTE — ED PROVIDER NOTE - CARE PROVIDER_API CALL
Mil Mera  ORTHOPAEDIC SURGERY  [address] [place] [address], [address] [place] [address]  Phone: [phone]  Fax: [phone]  Follow Up Time: 7-10 Days

## 2020-12-25 NOTE — ED PROVIDER NOTE - PATIENT PORTAL LINK FT
You can access the FollowMyHealth Patient Portal offered by [facility] by registering at the following website: [email or web address]. By joining [employer]’s FollowMyHealth portal, you will also be able to view your health information using other applications (apps) compatible with our system.

## 2020-12-25 NOTE — ED PROVIDER NOTE - PROGRESS NOTE DETAILS
Pt was able to walk around the department with a walker, without trouble. Phone  was used and pt stated he was walking with normal ability and feeling okay. He requested a prescription for physiotherapy which we will do. Will discharge home with ambulette because he has steps at home. pt brother called - will  - bring walker.

## 2020-12-25 NOTE — ED ADULT NURSE NOTE - OBJECTIVE STATEMENT
Pt presented to the ED with c/o fall from bed at home.  Pt triggered home alert device.  Pt has c/o pain to lower back.

## 2020-12-25 NOTE — ED PROVIDER NOTE - OBJECTIVE STATEMENT
89 y/o male h/o CAD s/p stents, DM, Hypercholesterolemia, HTN, BIB EMS because he fell down out of his bed and was unable to get up. Denies hitting his head or LOC. States he pushed his life alert button because he usually walks with a walker and could not get to it after the fall. Pt notes chronic knee pain which is moderate, however states R knee pain currently hurts more than usual and L knee pain is mild. Triage note states pt has back pain, but pt currently does not have back pain. I called pt's brother who confirms the pt lives alone but nearby, and if we discharge the pt the brother can check on him. 87 y/o male h/o CAD s/p stents, DM, Hypercholesterolemia, HTN, BIB EMS because he fell down out of his bed and was unable to get up. Denies hitting his head or LOC. States he pushed his life alert button because he usually walks with a walker and could not get to it after the fall. Pt notes chronic knee pain which is moderate, however states R knee pain currently hurts more than usual and L knee pain is mild. Triage note states pt has back pain, but pt currently does not have back pain. I called pt's brother who confirms the pt lives alone but nearby, and if we discharge the pt the brother can check on him. Brother speaks fluent english (trans used for pt)

## 2020-12-25 NOTE — ED PROVIDER NOTE - MUSCULOSKELETAL, MLM
No spinal tenderness. No chest wall tenderness. No pelvic tenderness. Able to range shoulder, elbow, knees, and hips. No bony deformities to upper and lower extremities.

## 2020-12-25 NOTE — ED ADULT NURSE NOTE - NSIMPLEMENTINTERV_GEN_ALL_ED
Implemented All Fall Risk Interventions:  Sarita to call system. Call bell, personal items and telephone within reach. Instruct patient to call for assistance. Room bathroom lighting operational. Non-slip footwear when patient is off stretcher. Physically safe environment: no spills, clutter or unnecessary equipment. Stretcher in lowest position, wheels locked, appropriate side rails in place. Provide visual cue, wrist band, yellow gown, etc. Monitor gait and stability. Monitor for mental status changes and reorient to person, place, and time. Review medications for side effects contributing to fall risk. Reinforce activity limits and safety measures with patient and family.

## 2020-12-25 NOTE — ED PROVIDER NOTE - CLINICAL SUMMARY MEDICAL DECISION MAKING FREE TEXT BOX
Will xray pt's knees. Will give pain control and do trial of ambulation. If pt is able to ambulate at baseline, will discharge pt by ambulette to house to ensure he is able to make it there.

## 2020-12-25 NOTE — ED PROVIDER NOTE - NSFOLLOWUPINSTRUCTIONS_ED_ALL_ED_FT
IMPORTANT INSTRUCTIONS FROM Dr. DIAZ:    Please follow up with your personal medical doctor in 24-48 hours.   Bring results from today to your visit.    If you were advised to take any medications - be sure to review the package insert.    If your symptoms change, get worse or if you have any new symptoms, come to the ER right away.  If you have any questions, call the ER at the phone number on this page.

## 2020-12-25 NOTE — ED PROVIDER NOTE - ENMT, MLM
No signs of head injury. Airway patent, Nasal mucosa clear. Mouth with normal mucosa. Throat has no vesicles, no oropharyngeal exudates and uvula is midline.

## 2021-01-20 NOTE — ED PROVIDER NOTE - TEMPLATE, MLM
ADAMARIS OD-  discussed findings w/ patient-  no holes tears or detachments noted-  carefully reviewed signs/ symptoms associated w/ retinal detachements- patient instructed to come in or call if any changes-  All restrictions have been lifted at this time-  patient may resume all regular activities-  no changes have been noted at today's f/u visit-  monitor prn-  RTC: N/A complete; 's Notes: DFE 1/20/2021
General

## 2021-04-08 NOTE — PATIENT PROFILE ADULT. - PRO ANTICIPATED DISCH DISP
What Type Of Note Output Would You Prefer (Optional)?: Standard Output How Severe Are Your Spot(S)?: moderate Have Your Spot(S) Been Treated In The Past?: has not been treated Hpi Title: Evaluation of Skin Lesions home

## 2021-09-16 NOTE — PATIENT PROFILE ADULT - [employer]
Rx Authorization:  Requested Medication/ Dose METHYLPREDNISOLONE 4MG DOSPAK 21S  Date last refill ordered: 6/4/20  Quantity ordered: 21 tabs  # refills: 0  Date of last clinic visit with ordering provider: 6/4/20  Date of next clinic visit with ordering provider:   All pertinent protocol data (lab date/result):   Include pertinent information from patients message:     
(3) slightly limited

## 2021-12-01 NOTE — DISCHARGE NOTE ADULT - NS MD DC FALL RISK RISK
No For information on Fall & Injury Prevention, visit [email or web address]

## 2022-06-29 NOTE — ED ADULT NURSE NOTE - CHIEF COMPLAINT QUOTE
No
Brought by [facility] for SOB sent by family , according to EMS patient satting 88% in field on room air, tachyneic. Patient abdomen distended. C/o chest pain, dizziness, weakness. Seen and treated for flu x 2 days ago on zpack currently. Hx 1 stent, HTN Placed on 4Ln Hx

## 2023-12-06 NOTE — DISCHARGE NOTE ADULT - MEDICATION SUMMARY - MEDICATIONS TO TAKE
I will START or STAY ON the medications listed below when I get home from the hospital:    Vascepa  -- 2 cap(s) by mouth 2 times a day  -- Indication: For Hypercholesterolemia    aspirin 81 mg oral delayed release tablet  -- 1 tab(s) by mouth once a day  -- Indication: For CAD    Diovan 160 mg oral tablet  -- 1 tab(s) by mouth once a day  -- Indication: For HTN    tamsulosin 0.4 mg oral capsule  -- 1 cap(s) by mouth once a day  -- Indication: For BPH    isosorbide mononitrate 30 mg oral tablet, extended release  -- 1 tab(s) by mouth once a day (in the morning)  -- Indication: For Angina    warfarin 5 mg oral tablet  -- 1 tab(s) by mouth once a day (at bedtime)   -- Indication: For Afib    glipiZIDE 5 mg oral tablet  -- 1 tab(s) by mouth 2 times a day  -- Indication: For Diabetes    metFORMIN 500 mg oral tablet  -- 1 tab(s) by mouth 2 times a day  -- Indication: For Diabetes    Uloric 40 mg oral tablet  -- 1 tab(s) by mouth once a day  -- Indication: For Gout    NIFEdipine 60 mg oral tablet, extended release  -- 1 tab(s) by mouth once a day  -- Indication: For HTN    furosemide 20 mg oral tablet  -- 1 tab(s) by mouth once a day  -- Indication: For CHF    ferrous sulfate 325 mg (65 mg elemental iron) oral delayed release tablet  -- 1 tab(s) by mouth once a day  -- Indication: For Iron deficiency anemia    docusate sodium 100 mg oral capsule  -- 1 cap(s) by mouth once a day  -- Indication: For Constipation    senna oral tablet  -- 2 tab(s) by mouth once a day (at bedtime)  -- Indication: For Constipation    Myrbetriq 25 mg oral tablet, extended release  -- 1 tab(s) by mouth once a day  -- Indication: For Overactive bladder Alert and oriented to person, place and time
